# Patient Record
Sex: MALE | Race: WHITE | NOT HISPANIC OR LATINO | URBAN - METROPOLITAN AREA
[De-identification: names, ages, dates, MRNs, and addresses within clinical notes are randomized per-mention and may not be internally consistent; named-entity substitution may affect disease eponyms.]

---
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
## 2019-01-10 ENCOUNTER — OFFICE VISIT (OUTPATIENT)
Dept: PRIMARY CARE | Facility: CLINIC | Age: 19
End: 2019-01-10
Payer: COMMERCIAL

## 2019-01-10 VITALS
HEIGHT: 70 IN | SYSTOLIC BLOOD PRESSURE: 122 MMHG | TEMPERATURE: 98.8 F | HEART RATE: 86 BPM | WEIGHT: 186.6 LBS | BODY MASS INDEX: 26.71 KG/M2 | RESPIRATION RATE: 12 BRPM | OXYGEN SATURATION: 98 % | DIASTOLIC BLOOD PRESSURE: 78 MMHG

## 2019-01-10 DIAGNOSIS — R61 HYPERHIDROSIS: ICD-10-CM

## 2019-01-10 DIAGNOSIS — Z00.00 VISIT FOR WELL MAN HEALTH CHECK: Primary | ICD-10-CM

## 2019-01-10 DIAGNOSIS — Z13.220 SCREENING, LIPID: ICD-10-CM

## 2019-01-10 DIAGNOSIS — F90.0 ATTENTION DEFICIT HYPERACTIVITY DISORDER (ADHD), PREDOMINANTLY INATTENTIVE TYPE: ICD-10-CM

## 2019-01-10 DIAGNOSIS — Z13.1 SCREENING FOR DIABETES MELLITUS (DM): ICD-10-CM

## 2019-01-10 DIAGNOSIS — K21.00 GASTROESOPHAGEAL REFLUX DISEASE WITH ESOPHAGITIS: ICD-10-CM

## 2019-01-10 PROCEDURE — 99395 PREV VISIT EST AGE 18-39: CPT | Performed by: FAMILY MEDICINE

## 2019-01-10 RX ORDER — OMEPRAZOLE 20 MG/1
20 CAPSULE, DELAYED RELEASE ORAL
Qty: 90 CAPSULE | Refills: 1 | Status: SHIPPED | OUTPATIENT
Start: 2019-01-10 | End: 2019-07-03 | Stop reason: ALTCHOICE

## 2019-01-10 RX ORDER — DEXTROAMPHETAMINE SACCHARATE, AMPHETAMINE ASPARTATE, DEXTROAMPHETAMINE SULFATE AND AMPHETAMINE SULFATE 2.5; 2.5; 2.5; 2.5 MG/1; MG/1; MG/1; MG/1
TABLET ORAL
Qty: 30 TABLET | Refills: 0 | Status: SHIPPED | OUTPATIENT
Start: 2019-01-10 | End: 2019-02-21 | Stop reason: SDUPTHER

## 2019-01-10 RX ORDER — DEXTROAMPHETAMINE SACCHARATE, AMPHETAMINE ASPARTATE MONOHYDRATE, DEXTROAMPHETAMINE SULFATE AND AMPHETAMINE SULFATE 5; 5; 5; 5 MG/1; MG/1; MG/1; MG/1
CAPSULE, EXTENDED RELEASE ORAL
Qty: 30 CAPSULE | Refills: 0 | Status: SHIPPED | OUTPATIENT
Start: 2019-01-10 | End: 2019-02-21 | Stop reason: SDUPTHER

## 2019-01-10 ASSESSMENT — ENCOUNTER SYMPTOMS
NEUROLOGICAL NEGATIVE: 1
GASTROINTESTINAL NEGATIVE: 1
CONSTITUTIONAL NEGATIVE: 1
RESPIRATORY NEGATIVE: 1
EYES NEGATIVE: 1
CARDIOVASCULAR NEGATIVE: 1
MUSCULOSKELETAL NEGATIVE: 1
ENDOCRINE NEGATIVE: 1
HEMATOLOGIC/LYMPHATIC NEGATIVE: 1

## 2019-01-10 NOTE — PATIENT INSTRUCTIONS
Patient Education     Health Maintenance, Male  A healthy lifestyle and preventive care is important for your health and wellness. Ask your health care provider about what schedule of regular examinations is right for you.  What should I know about weight and diet?  Eat a Healthy Diet  · Eat plenty of vegetables, fruits, whole grains, low-fat dairy products, and lean protein.  · Do not eat a lot of foods high in solid fats, added sugars, or salt.  Maintain a Healthy Weight  Regular exercise can help you achieve or maintain a healthy weight. You should:  · Do at least 150 minutes of exercise each week. The exercise should increase your heart rate and make you sweat (moderate-intensity exercise).  · Do strength-training exercises at least twice a week.  Watch Your Levels of Cholesterol and Blood Lipids  · Have your blood tested for lipids and cholesterol every 5 years starting at 35 years of age. If you are at high risk for heart disease, you should start having your blood tested when you are 20 years old. You may need to have your cholesterol levels checked more often if:  ¨ Your lipid or cholesterol levels are high.  ¨ You are older than 50 years of age.  ¨ You are at high risk for heart disease.  What should I know about cancer screening?  Many types of cancers can be detected early and may often be prevented.  Lung Cancer  · You should be screened every year for lung cancer if:  ¨ You are a current smoker who has smoked for at least 30 years.  ¨ You are a former smoker who has quit within the past 15 years.  · Talk to your health care provider about your screening options, when you should start screening, and how often you should be screened.  Colorectal Cancer  · Routine colorectal cancer screening usually begins at 50 years of age and should be repeated every 5-10 years until you are 75 years old. You may need to be screened more often if early forms of precancerous polyps or small growths are found. Your  health care provider may recommend screening at an earlier age if you have risk factors for colon cancer.  · Your health care provider may recommend using home test kits to check for hidden blood in the stool.  · A small camera at the end of a tube can be used to examine your colon (sigmoidoscopy or colonoscopy). This checks for the earliest forms of colorectal cancer.  Prostate and Testicular Cancer  · Depending on your age and overall health, your health care provider may do certain tests to screen for prostate and testicular cancer.  · Talk to your health care provider about any symptoms or concerns you have about testicular or prostate cancer.  Skin Cancer  · Check your skin from head to toe regularly.  · Tell your health care provider about any new moles or changes in moles, especially if:  ¨ There is a change in a mole’s size, shape, or color.  ¨ You have a mole that is larger than a pencil eraser.  · Always use sunscreen. Apply sunscreen liberally and repeat throughout the day.  · Protect yourself by wearing long sleeves, pants, a wide-brimmed hat, and sunglasses when outside.  What should I know about heart disease, diabetes, and high blood pressure?  · If you are 18-39 years of age, have your blood pressure checked every 3-5 years. If you are 40 years of age or older, have your blood pressure checked every year. You should have your blood pressure measured twice--once when you are at a hospital or clinic, and once when you are not at a hospital or clinic. Record the average of the two measurements. To check your blood pressure when you are not at a hospital or clinic, you can use:  ¨ An automated blood pressure machine at a pharmacy.  ¨ A home blood pressure monitor.  · Talk to your health care provider about your target blood pressure.  · If you are between 45-79 years old, ask your health care provider if you should take aspirin to prevent heart disease.  · Have regular diabetes screenings by checking  your fasting blood sugar level.  ¨ If you are at a normal weight and have a low risk for diabetes, have this test once every three years after the age of 45.  ¨ If you are overweight and have a high risk for diabetes, consider being tested at a younger age or more often.  · A one-time screening for abdominal aortic aneurysm (AAA) by ultrasound is recommended for men aged 65-75 years who are current or former smokers.  What should I know about preventing infection?  Hepatitis B  If you have a higher risk for hepatitis B, you should be screened for this virus. Talk with your health care provider to find out if you are at risk for hepatitis B infection.  Hepatitis C  Blood testing is recommended for:  · Everyone born from 1945 through 1965.  · Anyone with known risk factors for hepatitis C.  Sexually Transmitted Diseases (STDs)  · You should be screened each year for STDs including gonorrhea and chlamydia if:  ¨ You are sexually active and are younger than 24 years of age.  ¨ You are older than 24 years of age and your health care provider tells you that you are at risk for this type of infection.  ¨ Your sexual activity has changed since you were last screened and you are at an increased risk for chlamydia or gonorrhea. Ask your health care provider if you are at risk.  · Talk with your health care provider about whether you are at high risk of being infected with HIV. Your health care provider may recommend a prescription medicine to help prevent HIV infection.  What else can I do?  · Schedule regular health, dental, and eye exams.  · Stay current with your vaccines (immunizations).  · Do not use any tobacco products, such as cigarettes, chewing tobacco, and e-cigarettes. If you need help quitting, ask your health care provider.  · Limit alcohol intake to no more than 2 drinks per day. One drink equals 12 ounces of beer, 5 ounces of wine, or 1½ ounces of hard liquor.  · Do not use street drugs.  · Do not share  needles.  · Ask your health care provider for help if you need support or information about quitting drugs.  · Tell your health care provider if you often feel depressed.  · Tell your health care provider if you have ever been abused or do not feel safe at home.  This information is not intended to replace advice given to you by your health care provider. Make sure you discuss any questions you have with your health care provider.  Document Released: 06/15/2009 Document Revised: 08/16/2017 Document Reviewed: 09/20/2016  Elsevier Interactive Patient Education © 2018 Elsevier Inc.

## 2019-01-10 NOTE — PROGRESS NOTES
Subjective      Patient ID: Kevin Almanza is a 18 y.o. male.  2000      HPI Here for annual health check and for ADD followup.  States that he sweats a lot especially in the arm pits. Has had gerd recently.     The following have been reviewed and updated as appropriate in this visit:  Tobacco  Allergies  Meds  Med Hx  Surg Hx  Fam Hx  Soc Hx      Review of Systems   Constitutional: Negative.    HENT: Negative.    Eyes: Negative.    Respiratory: Negative.    Cardiovascular: Negative.    Gastrointestinal: Negative.    Endocrine: Negative.    Genitourinary: Negative.    Musculoskeletal: Negative.    Skin: Negative.    Neurological: Negative.    Hematological: Negative.      Active Ambulatory Problems     Diagnosis Date Noted   • Attention deficit hyperactivity disorder (ADHD), predominantly inattentive type 08/08/2016   • Visit for well man health check 01/10/2019   • Hyperhidrosis 01/10/2019   • Gastroesophageal reflux disease with esophagitis 01/10/2019   • Screening, lipid 01/10/2019   • Screening for diabetes mellitus (DM) 01/10/2019     Resolved Ambulatory Problems     Diagnosis Date Noted   • No Resolved Ambulatory Problems     Past Medical History:   Diagnosis Date   • ADD (attention deficit disorder)        Current Outpatient Prescriptions:   •  amphetamine-dextroamphetamine XR (ADDERALL XR) 20 mg 24 hr capsule, Take one capsule by oral route every day in the morning upon awakening, Disp: 30 capsule, Rfl: 0  •  dextroamphetamine-amphetamine (ADDERALL) 10 mg tablet, Take one tablet by oral route every day before breakfast, Disp: 30 tablet, Rfl: 0  •  aluminum chloride (DRYSOL DAB-O-MATIC) 20 % external solution, Apply topically nightly., Disp: 35 mL, Rfl: 1  •  omeprazole (PriLOSEC) 20 mg capsule, Take 1 capsule (20 mg total) by mouth daily before breakfast., Disp: 90 capsule, Rfl: 1  No Known Allergies      Social History     Social History   • Marital status: Single     Spouse name: N/A   • Number of  "children: N/A   • Years of education: N/A     Social History Main Topics   • Smoking status: Never Smoker   • Smokeless tobacco: Never Used   • Alcohol use Yes      Comment: socially   • Drug use: No   • Sexual activity: Yes     Other Topics Concern   • None     Social History Narrative   • None     Family History   Problem Relation Age of Onset   • Family history unknown: Yes     History reviewed. No pertinent surgical history.    Objective     Vitals:    01/10/19 1337   BP: 122/78   BP Location: Right upper arm   Patient Position: Sitting   Pulse: 86   Resp: 12   Temp: 37.1 °C (98.8 °F)   TempSrc: Oral   SpO2: 98%   Weight: 84.6 kg (186 lb 9.6 oz)   Height: 1.778 m (5' 10\")     Body mass index is 26.77 kg/m².    Physical Exam   Constitutional: He is oriented to person, place, and time. He appears well-developed and well-nourished.   HENT:   Head: Normocephalic and atraumatic.   Eyes: EOM are normal. Pupils are equal, round, and reactive to light.   Cardiovascular: Normal rate and regular rhythm.    Pulmonary/Chest: Effort normal.   Abdominal: Soft.   Musculoskeletal: Normal range of motion.   Neurological: He is alert and oriented to person, place, and time.   Skin: Skin is warm.   Nursing note and vitals reviewed.      Assessment/Plan   Problem List Items Addressed This Visit     Attention deficit hyperactivity disorder (ADHD), predominantly inattentive type    Relevant Medications    amphetamine-dextroamphetamine XR (ADDERALL XR) 20 mg 24 hr capsule    dextroamphetamine-amphetamine (ADDERALL) 10 mg tablet    Other Relevant Orders    CBC and Differential    Visit for [facility] health check - Primary    Relevant Orders    CBC and Differential    Hyperhidrosis    Relevant Medications    aluminum chloride (DRYSOL DAB-O-MATIC) 20 % external solution    Other Relevant Orders    CBC and Differential    Gastroesophageal reflux disease with esophagitis    Relevant Medications    omeprazole (PriLOSEC) 20 mg capsule    " Other Relevant Orders    CBC and Differential    Screening, lipid    Relevant Orders    CBC and Differential    Lipid panel    Screening for diabetes mellitus (DM)    Relevant Orders    CBC and Differential    Comprehensive metabolic panel

## 2019-02-21 DIAGNOSIS — F90.0 ATTENTION DEFICIT HYPERACTIVITY DISORDER (ADHD), PREDOMINANTLY INATTENTIVE TYPE: ICD-10-CM

## 2019-02-21 RX ORDER — DEXTROAMPHETAMINE SACCHARATE, AMPHETAMINE ASPARTATE MONOHYDRATE, DEXTROAMPHETAMINE SULFATE AND AMPHETAMINE SULFATE 5; 5; 5; 5 MG/1; MG/1; MG/1; MG/1
CAPSULE, EXTENDED RELEASE ORAL
Qty: 30 CAPSULE | Refills: 0 | Status: SHIPPED | OUTPATIENT
Start: 2019-02-21 | End: 2019-03-28 | Stop reason: SDUPTHER

## 2019-02-21 RX ORDER — DEXTROAMPHETAMINE SACCHARATE, AMPHETAMINE ASPARTATE, DEXTROAMPHETAMINE SULFATE AND AMPHETAMINE SULFATE 2.5; 2.5; 2.5; 2.5 MG/1; MG/1; MG/1; MG/1
TABLET ORAL
Qty: 30 TABLET | Refills: 0 | Status: SHIPPED | OUTPATIENT
Start: 2019-02-21 | End: 2019-03-28 | Stop reason: SDUPTHER

## 2019-03-28 DIAGNOSIS — F90.0 ATTENTION DEFICIT HYPERACTIVITY DISORDER (ADHD), PREDOMINANTLY INATTENTIVE TYPE: ICD-10-CM

## 2019-03-28 RX ORDER — DEXTROAMPHETAMINE SACCHARATE, AMPHETAMINE ASPARTATE MONOHYDRATE, DEXTROAMPHETAMINE SULFATE AND AMPHETAMINE SULFATE 5; 5; 5; 5 MG/1; MG/1; MG/1; MG/1
CAPSULE, EXTENDED RELEASE ORAL
Qty: 30 CAPSULE | Refills: 0 | Status: SHIPPED | OUTPATIENT
Start: 2019-03-28 | End: 2019-04-29 | Stop reason: SDUPTHER

## 2019-03-28 RX ORDER — DEXTROAMPHETAMINE SACCHARATE, AMPHETAMINE ASPARTATE, DEXTROAMPHETAMINE SULFATE AND AMPHETAMINE SULFATE 2.5; 2.5; 2.5; 2.5 MG/1; MG/1; MG/1; MG/1
TABLET ORAL
Qty: 30 TABLET | Refills: 0 | Status: SHIPPED | OUTPATIENT
Start: 2019-03-28 | End: 2019-04-29 | Stop reason: SDUPTHER

## 2019-04-29 DIAGNOSIS — F90.0 ATTENTION DEFICIT HYPERACTIVITY DISORDER (ADHD), PREDOMINANTLY INATTENTIVE TYPE: ICD-10-CM

## 2019-04-29 RX ORDER — DEXTROAMPHETAMINE SACCHARATE, AMPHETAMINE ASPARTATE, DEXTROAMPHETAMINE SULFATE AND AMPHETAMINE SULFATE 2.5; 2.5; 2.5; 2.5 MG/1; MG/1; MG/1; MG/1
TABLET ORAL
Qty: 30 TABLET | Refills: 0 | Status: SHIPPED | OUTPATIENT
Start: 2019-04-29 | End: 2019-08-06 | Stop reason: SDUPTHER

## 2019-04-29 RX ORDER — DEXTROAMPHETAMINE SACCHARATE, AMPHETAMINE ASPARTATE MONOHYDRATE, DEXTROAMPHETAMINE SULFATE AND AMPHETAMINE SULFATE 5; 5; 5; 5 MG/1; MG/1; MG/1; MG/1
CAPSULE, EXTENDED RELEASE ORAL
Qty: 30 CAPSULE | Refills: 0 | Status: SHIPPED | OUTPATIENT
Start: 2019-04-29 | End: 2019-08-06 | Stop reason: SDUPTHER

## 2019-05-16 ENCOUNTER — OFFICE VISIT (OUTPATIENT)
Dept: PRIMARY CARE | Facility: CLINIC | Age: 19
End: 2019-05-16
Payer: COMMERCIAL

## 2019-05-16 VITALS
BODY MASS INDEX: 25.02 KG/M2 | OXYGEN SATURATION: 96 % | HEART RATE: 103 BPM | DIASTOLIC BLOOD PRESSURE: 58 MMHG | WEIGHT: 174.4 LBS | SYSTOLIC BLOOD PRESSURE: 114 MMHG | RESPIRATION RATE: 12 BRPM

## 2019-05-16 DIAGNOSIS — G25.81 RESTLESS LEG SYNDROME: Primary | ICD-10-CM

## 2019-05-16 DIAGNOSIS — G47.00 INSOMNIA, UNSPECIFIED TYPE: ICD-10-CM

## 2019-05-16 PROCEDURE — 99214 OFFICE O/P EST MOD 30 MIN: CPT | Performed by: FAMILY MEDICINE

## 2019-05-16 RX ORDER — ROPINIROLE 3 MG/1
3 TABLET, FILM COATED ORAL NIGHTLY
Qty: 30 TABLET | Refills: 1 | Status: SHIPPED | OUTPATIENT
Start: 2019-05-16 | End: 2019-06-27 | Stop reason: ALTCHOICE

## 2019-05-16 ASSESSMENT — ENCOUNTER SYMPTOMS
EYES NEGATIVE: 1
HEMATOLOGIC/LYMPHATIC NEGATIVE: 1
PSYCHIATRIC NEGATIVE: 1
CARDIOVASCULAR NEGATIVE: 1
RESPIRATORY NEGATIVE: 1
NEUROLOGICAL NEGATIVE: 1
MUSCULOSKELETAL NEGATIVE: 1
CONSTITUTIONAL NEGATIVE: 1
GASTROINTESTINAL NEGATIVE: 1

## 2019-05-16 NOTE — PATIENT INSTRUCTIONS
Patient Education   Restless Legs Syndrome  Restless legs syndrome is a condition that causes uncomfortable feelings or sensations in the legs, especially while sitting or lying down. The sensations usually cause an overwhelming urge to move the legs. The arms can also sometimes be affected.  The condition can range from mild to severe. The symptoms often interfere with a person's ability to sleep.  What are the causes?  The cause of this condition is not known.  What increases the risk?  This condition is more likely to develop in:  · People who are older than age 50.  · Pregnant women. In general, restless legs syndrome is more common in women than in men.  · People who have a family history of the condition.  · People who have certain medical conditions, such as iron deficiency, kidney disease, Parkinson disease, or nerve damage.  · People who take certain medicines, such as medicines for high blood pressure, nausea, colds, allergies, depression, and some heart conditions.  What are the signs or symptoms?  The main symptom of this condition is uncomfortable sensations in the legs. These sensations may be:  · Described as pulling, tingling, prickling, throbbing, crawling, or burning.  · Worse while you are sitting or lying down.  · Worse during periods of rest or inactivity.  · Worse at night, often interfering with your sleep.  · Accompanied by a very strong urge to move your legs.  · Temporarily relieved by movement of your legs.  The sensations usually affect both sides of the body. The arms can also be affected, but this is rare. People who have this condition often have tiredness during the day because of their lack of sleep at night.  How is this diagnosed?  This condition may be diagnosed based on your description of the symptoms. You may also have tests, including blood tests, to check for other conditions that may lead to your symptoms. In some cases, you may be asked to spend some time in a sleep lab  so your sleeping can be monitored.  How is this treated?  Treatment for this condition is focused on managing the symptoms. Treatment may include:  · Self-help and lifestyle changes.  · Medicines.  Follow these instructions at home:  · Take medicines only as directed by your health care provider.  · Try these methods to get temporary relief from the uncomfortable sensations:  ¨ Massage your legs.  ¨ Walk or stretch.  ¨ Take a cold or hot bath.  · Practice good sleep habits. For example, go to bed and get up at the same time every day.  · Exercise regularly.  · Practice ways of relaxing, such as yoga or meditation.  · Avoid caffeine and alcohol.  · Do not use any tobacco products, including cigarettes, chewing tobacco, or electronic cigarettes. If you need help quitting, ask your health care provider.  · Keep all follow-up visits as directed by your health care provider. This is important.  Contact a health care provider if:  Your symptoms do not improve with treatment, or they get worse.  This information is not intended to replace advice given to you by your health care provider. Make sure you discuss any questions you have with your health care provider.  Document Released: 12/08/2003 Document Revised: 05/25/2017 Document Reviewed: 12/14/2015  [employer] Interactive Patient Education © 2018 [employer]

## 2019-05-16 NOTE — PROGRESS NOTES
Patient called stating that she called 10 days ago and would like for DR Dominique Bah to call her. She states that he will call her back.  I will route the call to the provider for him to call her at his earliest time Subjective      Patient ID: Kevin Almanza is a 19 y.o. male.  2000      HPI States that restless leg syndrome and insomnia started about a month ago. Is not sleeping well.    The following have been reviewed and updated as appropriate in this visit:  Tobacco  Allergies  Meds  Med Hx  Surg Hx  Fam Hx  Soc Hx      Review of Systems   Constitutional: Negative.    HENT: Negative.    Eyes: Negative.    Respiratory: Negative.    Cardiovascular: Negative.    Gastrointestinal: Negative.    Genitourinary: Negative.    Musculoskeletal: Negative.    Neurological: Negative.    Hematological: Negative.    Psychiatric/Behavioral: Negative.      Active Ambulatory Problems     Diagnosis Date Noted   • Attention deficit hyperactivity disorder (ADHD), predominantly inattentive type 08/08/2016   • Visit for well man health check 01/10/2019   • Hyperhidrosis 01/10/2019   • Gastroesophageal reflux disease with esophagitis 01/10/2019   • Screening, lipid 01/10/2019   • Screening for diabetes mellitus (DM) 01/10/2019   • Restless leg syndrome 05/16/2019   • Insomnia 05/16/2019     Resolved Ambulatory Problems     Diagnosis Date Noted   • No Resolved Ambulatory Problems     Past Medical History:   Diagnosis Date   • ADD (attention deficit disorder)        Current Outpatient Prescriptions:   •  amphetamine-dextroamphetamine XR (ADDERALL XR) 20 mg 24 hr capsule, Take one capsule by oral route every day in the morning upon awakening, Disp: 30 capsule, Rfl: 0  •  dextroamphetamine-amphetamine (ADDERALL) 10 mg tablet, Take one tablet by oral route every day before breakfast, Disp: 30 tablet, Rfl: 0  •  aluminum chloride (DRYSOL DAB-O-MATIC) 20 % external solution, Apply topically nightly. (Patient not taking: Reported on 5/16/2019 ), Disp: 35 mL, Rfl: 1  •  omeprazole (PriLOSEC) 20 mg capsule, Take 1 capsule (20 mg total) by mouth daily before breakfast. (Patient not taking: Reported on 5/16/2019 ), Disp: 90 capsule, Rfl: 1  •   rOPINIRole (REQUIP) 3 mg tablet, Take 1 tablet (3 mg total) by mouth nightly., Disp: 30 tablet, Rfl: 1  No Known Allergies  Social History     Social History   • Marital status: Single     Spouse name: N/A   • Number of children: N/A   • Years of education: N/A     Social History Main Topics   • Smoking status: Never Smoker   • Smokeless tobacco: Never Used   • Alcohol use Yes      Comment: socially   • Drug use: No   • Sexual activity: Yes     Other Topics Concern   • None     Social History Narrative   • None     Family History   Problem Relation Age of Onset   • Family history unknown: Yes     History reviewed. No pertinent surgical history.    Objective     Vitals:    05/16/19 1138   BP: (!) 114/58   Pulse: (!) 103   Resp: 12   SpO2: 96%   Weight: 79.1 kg (174 lb 6.4 oz)     Body mass index is 25.02 kg/m².    Physical Exam   Constitutional: He is oriented to person, place, and time. He appears well-developed and well-nourished.   HENT:   Head: Normocephalic and atraumatic.   Eyes: Pupils are equal, round, and reactive to light. EOM are normal.   Cardiovascular: Normal rate and regular rhythm.    Pulmonary/Chest: Effort normal.   Abdominal: Soft.   Musculoskeletal: Normal range of motion.   Neurological: He is alert and oriented to person, place, and time.   Nursing note and vitals reviewed.      Assessment/Plan   Diagnoses and all orders for this visit:    Restless leg syndrome (Primary)  Assessment & Plan:  Will start on requip      Insomnia, unspecified type  Assessment & Plan:  May be secondary to RLS.      Other orders  -     rOPINIRole (REQUIP) 3 mg tablet; Take 1 tablet (3 mg total) by mouth nightly.

## 2019-05-21 ENCOUNTER — TELEPHONE (OUTPATIENT)
Dept: PRIMARY CARE | Facility: CLINIC | Age: 19
End: 2019-05-21

## 2019-05-21 LAB
ALBUMIN SERPL-MCNC: 4.9 G/DL (ref 3.5–5.5)
ALBUMIN/GLOB SERPL: 2 {RATIO} (ref 1.2–2.2)
ALP SERPL-CCNC: 60 IU/L (ref 39–117)
ALT SERPL-CCNC: 16 IU/L (ref 0–44)
AST SERPL-CCNC: 22 IU/L (ref 0–40)
BASOPHILS # BLD AUTO: 0.1 X10E3/UL (ref 0–0.2)
BASOPHILS NFR BLD AUTO: 1 %
BILIRUB SERPL-MCNC: 0.5 MG/DL (ref 0–1.2)
BUN SERPL-MCNC: 11 MG/DL (ref 6–20)
BUN/CREAT SERPL: 11 (ref 9–20)
CALCIUM SERPL-MCNC: 9.6 MG/DL (ref 8.7–10.2)
CHLORIDE SERPL-SCNC: 99 MMOL/L (ref 96–106)
CHOLEST SERPL-MCNC: 224 MG/DL (ref 100–199)
CO2 SERPL-SCNC: 23 MMOL/L (ref 20–29)
CREAT SERPL-MCNC: 1.03 MG/DL (ref 0.76–1.27)
EOSINOPHIL # BLD AUTO: 0.1 X10E3/UL (ref 0–0.4)
EOSINOPHIL NFR BLD AUTO: 2 %
ERYTHROCYTE [DISTWIDTH] IN BLOOD BY AUTOMATED COUNT: 13.9 % (ref 12.3–15.4)
GLOBULIN SER CALC-MCNC: 2.5 G/DL (ref 1.5–4.5)
GLUCOSE SERPL-MCNC: 102 MG/DL (ref 65–99)
HCT VFR BLD AUTO: 47.7 % (ref 37.5–51)
HDLC SERPL-MCNC: 77 MG/DL
HGB BLD-MCNC: 15.8 G/DL (ref 13–17.7)
IMM GRANULOCYTES # BLD AUTO: 0 X10E3/UL (ref 0–0.1)
IMM GRANULOCYTES NFR BLD AUTO: 0 %
LAB CORP EGFR IF AFRICN AM: 120 ML/MIN/1.73
LAB CORP EGFR IF NONAFRICN AM: 104 ML/MIN/1.73
LDLC SERPL CALC-MCNC: 126 MG/DL (ref 0–99)
LYMPHOCYTES # BLD AUTO: 1.7 X10E3/UL (ref 0.7–3.1)
LYMPHOCYTES NFR BLD AUTO: 30 %
MCH RBC QN AUTO: 30.3 PG (ref 26.6–33)
MCHC RBC AUTO-ENTMCNC: 33.1 G/DL (ref 31.5–35.7)
MCV RBC AUTO: 91 FL (ref 79–97)
MONOCYTES # BLD AUTO: 0.5 X10E3/UL (ref 0.1–0.9)
MONOCYTES NFR BLD AUTO: 8 %
NEUTROPHILS # BLD AUTO: 3.5 X10E3/UL (ref 1.4–7)
NEUTROPHILS NFR BLD AUTO: 59 %
PLATELET # BLD AUTO: 289 X10E3/UL (ref 150–450)
POTASSIUM SERPL-SCNC: 3.9 MMOL/L (ref 3.5–5.2)
PROT SERPL-MCNC: 7.4 G/DL (ref 6–8.5)
RBC # BLD AUTO: 5.22 X10E6/UL (ref 4.14–5.8)
SODIUM SERPL-SCNC: 140 MMOL/L (ref 134–144)
TRIGL SERPL-MCNC: 105 MG/DL (ref 0–149)
VLDLC SERPL CALC-MCNC: 21 MG/DL (ref 5–40)
WBC # BLD AUTO: 5.8 X10E3/UL (ref 3.4–10.8)

## 2019-05-21 NOTE — TELEPHONE ENCOUNTER
----- Message from Julian Blanca DO sent at 5/21/2019 10:01 AM EDT -----  Please inform patient that his lab work was all normal except for an elevated cholesterol of 224. He needs to watch his diet and exercise. I would like to recheck his labs in 3-6 months.

## 2019-06-06 ENCOUNTER — OFFICE VISIT (OUTPATIENT)
Dept: PRIMARY CARE | Facility: CLINIC | Age: 19
End: 2019-06-06
Payer: COMMERCIAL

## 2019-06-06 VITALS
HEART RATE: 70 BPM | BODY MASS INDEX: 25.83 KG/M2 | WEIGHT: 180 LBS | SYSTOLIC BLOOD PRESSURE: 120 MMHG | OXYGEN SATURATION: 98 % | DIASTOLIC BLOOD PRESSURE: 80 MMHG

## 2019-06-06 DIAGNOSIS — G25.9 MOVEMENT DISORDER: Primary | ICD-10-CM

## 2019-06-06 DIAGNOSIS — G25.81 RESTLESS LEG SYNDROME: ICD-10-CM

## 2019-06-06 PROCEDURE — 99214 OFFICE O/P EST MOD 30 MIN: CPT | Performed by: FAMILY MEDICINE

## 2019-06-06 ASSESSMENT — ENCOUNTER SYMPTOMS
CARDIOVASCULAR NEGATIVE: 1
GASTROINTESTINAL NEGATIVE: 1
HEMATOLOGIC/LYMPHATIC NEGATIVE: 1
CONSTITUTIONAL NEGATIVE: 1
RESPIRATORY NEGATIVE: 1
NEUROLOGICAL NEGATIVE: 1
MUSCULOSKELETAL NEGATIVE: 1
EYES NEGATIVE: 1

## 2019-06-06 NOTE — PROGRESS NOTES
Subjective      Patient ID: Kevin Almanza is a 19 y.o. male.  2000      HPI Has restless leg syndrome thathas not been helped with requip although patient only used it for a few days.  Now says that he has random movement during the day.     The following have been reviewed and updated as appropriate in this visit:  Tobacco  Allergies  Meds  Med Hx  Surg Hx  Fam Hx  Soc Hx      Review of Systems   Constitutional: Negative.    HENT: Negative.    Eyes: Negative.    Respiratory: Negative.    Cardiovascular: Negative.    Gastrointestinal: Negative.    Genitourinary: Negative.    Musculoskeletal: Negative.    Neurological: Negative.    Hematological: Negative.      Active Ambulatory Problems     Diagnosis Date Noted   • Attention deficit hyperactivity disorder (ADHD), predominantly inattentive type 08/08/2016   • Visit for well man health check 01/10/2019   • Hyperhidrosis 01/10/2019   • Gastroesophageal reflux disease with esophagitis 01/10/2019   • Screening, lipid 01/10/2019   • Screening for diabetes mellitus (DM) 01/10/2019   • Restless leg syndrome 05/16/2019   • Insomnia 05/16/2019   • Movement disorder 06/06/2019     Resolved Ambulatory Problems     Diagnosis Date Noted   • No Resolved Ambulatory Problems     Past Medical History:   Diagnosis Date   • ADD (attention deficit disorder)        Current Outpatient Prescriptions:   •  amphetamine-dextroamphetamine XR (ADDERALL XR) 20 mg 24 hr capsule, Take one capsule by oral route every day in the morning upon awakening, Disp: 30 capsule, Rfl: 0  •  dextroamphetamine-amphetamine (ADDERALL) 10 mg tablet, Take one tablet by oral route every day before breakfast, Disp: 30 tablet, Rfl: 0  •  aluminum chloride (DRYSOL DAB-O-MATIC) 20 % external solution, Apply topically nightly. (Patient not taking: Reported on 6/6/2019 ), Disp: 35 mL, Rfl: 1  •  omeprazole (PriLOSEC) 20 mg capsule, Take 1 capsule (20 mg total) by mouth daily before breakfast. (Patient  not taking: Reported on 6/6/2019 ), Disp: 90 capsule, Rfl: 1  •  rOPINIRole (REQUIP) 3 mg tablet, Take 1 tablet (3 mg total) by mouth nightly. (Patient not taking: Reported on 6/6/2019 ), Disp: 30 tablet, Rfl: 1  No Known Allergies  Social History     Social History   • Marital status: Single     Spouse name: N/A   • Number of children: N/A   • Years of education: N/A     Social History Main Topics   • Smoking status: Never Smoker   • Smokeless tobacco: Never Used   • Alcohol use Yes      Comment: socially   • Drug use: No   • Sexual activity: Yes     Other Topics Concern   • None     Social History Narrative   • None     Family History   Problem Relation Age of Onset   • Family history unknown: Yes     History reviewed. No pertinent surgical history.    Objective     Vitals:    06/06/19 1038   BP: 120/80   Pulse: 70   SpO2: 98%   Weight: 81.6 kg (180 lb)     Body mass index is 25.83 kg/m².    Physical Exam   Constitutional: He appears well-developed and well-nourished.   HENT:   Head: Normocephalic and atraumatic.   Eyes: Pupils are equal, round, and reactive to light. EOM are normal.   Neck: Normal range of motion.   Cardiovascular: Normal rate and regular rhythm.    Pulmonary/Chest: Effort normal and breath sounds normal.   Abdominal: Soft.   Musculoskeletal: Normal range of motion.   Neurological: He is alert.   Skin: Skin is warm.   Nursing note and vitals reviewed.      Assessment/Plan   Diagnoses and all orders for this visit:    Movement disorder (Primary)  -     Ambulatory referral to Neurology; Future    Restless leg syndrome  -     Ambulatory referral to Neurology; Future

## 2019-06-06 NOTE — PATIENT INSTRUCTIONS
Patient Education   Restless Legs Syndrome  Restless legs syndrome is a condition that causes uncomfortable feelings or sensations in the legs, especially while sitting or lying down. The sensations usually cause an overwhelming urge to move the legs. The arms can also sometimes be affected.  The condition can range from mild to severe. The symptoms often interfere with a person's ability to sleep.  What are the causes?  The cause of this condition is not known.  What increases the risk?  This condition is more likely to develop in:  · People who are older than age 50.  · Pregnant women. In general, restless legs syndrome is more common in women than in men.  · People who have a family history of the condition.  · People who have certain medical conditions, such as iron deficiency, kidney disease, Parkinson disease, or nerve damage.  · People who take certain medicines, such as medicines for high blood pressure, nausea, colds, allergies, depression, and some heart conditions.  What are the signs or symptoms?  The main symptom of this condition is uncomfortable sensations in the legs. These sensations may be:  · Described as pulling, tingling, prickling, throbbing, crawling, or burning.  · Worse while you are sitting or lying down.  · Worse during periods of rest or inactivity.  · Worse at night, often interfering with your sleep.  · Accompanied by a very strong urge to move your legs.  · Temporarily relieved by movement of your legs.  The sensations usually affect both sides of the body. The arms can also be affected, but this is rare. People who have this condition often have tiredness during the day because of their lack of sleep at night.  How is this diagnosed?  This condition may be diagnosed based on your description of the symptoms. You may also have tests, including blood tests, to check for other conditions that may lead to your symptoms. In some cases, you may be asked to spend some time in a sleep lab  so your sleeping can be monitored.  How is this treated?  Treatment for this condition is focused on managing the symptoms. Treatment may include:  · Self-help and lifestyle changes.  · Medicines.  Follow these instructions at home:  · Take medicines only as directed by your health care provider.  · Try these methods to get temporary relief from the uncomfortable sensations:  ¨ Massage your legs.  ¨ Walk or stretch.  ¨ Take a cold or hot bath.  · Practice good sleep habits. For example, go to bed and get up at the same time every day.  · Exercise regularly.  · Practice ways of relaxing, such as yoga or meditation.  · Avoid caffeine and alcohol.  · Do not use any tobacco products, including cigarettes, chewing tobacco, or electronic cigarettes. If you need help quitting, ask your health care provider.  · Keep all follow-up visits as directed by your health care provider. This is important.  Contact a health care provider if:  Your symptoms do not improve with treatment, or they get worse.  This information is not intended to replace advice given to you by your health care provider. Make sure you discuss any questions you have with your health care provider.  Document Released: 12/08/2003 Document Revised: 05/25/2017 Document Reviewed: 12/14/2015  [employer] Interactive Patient Education © 2018 [employer]

## 2019-06-27 ENCOUNTER — OFFICE VISIT (OUTPATIENT)
Dept: NEUROLOGY | Facility: CLINIC | Age: 19
End: 2019-06-27
Payer: COMMERCIAL

## 2019-06-27 VITALS — RESPIRATION RATE: 14 BRPM | DIASTOLIC BLOOD PRESSURE: 70 MMHG | SYSTOLIC BLOOD PRESSURE: 124 MMHG | HEART RATE: 85 BPM

## 2019-06-27 DIAGNOSIS — G25.81 RESTLESS LEG SYNDROME: Primary | ICD-10-CM

## 2019-06-27 PROCEDURE — 99245 OFF/OP CONSLTJ NEW/EST HI 55: CPT | Performed by: PSYCHIATRY & NEUROLOGY

## 2019-06-27 NOTE — PROGRESS NOTES
Patient ID: Kevin Almanza                              : 2000  MRN: [id number]                                            VISIT DATE: 2019    PRIMARY CARE PROVIDER: Julian Denson DO    CONSULTING PHYSICIAN: Jaime Campos DO    CHIEF COMPLAINT: Restless Legs    HISTORY OF PRESENT ILLNESS:  Dear Dr. Denson,    I had the pleasure of seeing your patient Mr. Kevin Almanza at the [facility] neurolog[facility] clinic for a consultation.  He is accompanied by his father.    As you know, Mr. Almanza is a 19 year old left handed male with a past medical history of ADD and dyslipidemia who presents with intermittent limb movements.  Symptoms began about 2 months ago immediately after he fell and struck his head.  States that he had been drinking alcohol beverages with friends and may have tripped and fallen and landed on his face.  He woke up the next day and noted blood coming out of his nose.  Was evaluated by a medical provider and told that he had a contusion.  Since then he has been noticing episodes of brief sudden leg movements/twitching particularly at night when he is trying to sleep.  Can occur every 30 seconds.  Occasional episodes occur during the daytime.  Denies any urge to move his limbs prior to the movements.  Denies any neck or back pain.  Denies incontinence or erectile dysfunction.  Denies facial droop, vision loss, speech disturbance, limb weakness nor gait ataxia.  He has never had symptoms like this in the past.  Does note that his head and knees can feel uncomfortableprior to his limb movements.  Was started briefly on ropinerole by his PCP, but discontinued after 3 doses as he agnieszka his twitching was worse and he was getting nauseous.    Sleeps 7-8 hours per night.  Drinks occasional coffee in the morning, but not past 7 am.  Does have daytime sleepiness.  Snores on occasion such as when he has an upper respiratory infection.      States he was born full term and reached all  developmental milestones at the expected age.  Denies history of febrile seizure as an infant or young child.  Denies family history of epilepsy nor other significant neurological disease.    He  is currently a mateus in college.  He goes to [employer] in [place].  Will be returning to [place] for college on August 8th.  He is studying business management.      Has a history of ADD and takes Adderall, but not during the summer.    Denies AH/VH/SI.  Denies feeling threatened at school, home or work.  He is physically active, working out in the gym and playing basketball regularly.  He is independent for all ADL's including walking on his own power.    I reviewed his outside medical records personally.    MEDICATIONS:   Current Outpatient Prescriptions:   •  amphetamine-dextroamphetamine XR (ADDERALL XR) 20 mg 24 hr capsule, Take one capsule by oral route every day in the morning upon awakening, Disp: 30 capsule, Rfl: 0  •  dextroamphetamine-amphetamine (ADDERALL) 10 mg tablet, Take one tablet by oral route every day before breakfast, Disp: 30 tablet, Rfl: 0  •  aluminum chloride (DRYSOL DAB-O-MATIC) 20 % external solution, Apply topically nightly. (Patient not taking: Reported on 6/6/2019 ), Disp: 35 mL, Rfl: 1  •  gabapentin enacarbil 300 mg tablet extended release, Take 300 mg by mouth nightly., Disp: 30 tablet, Rfl: 3  •  omeprazole (PriLOSEC) 20 mg capsule, Take 1 capsule (20 mg total) by mouth daily before breakfast. (Patient not taking: Reported on 6/6/2019 ), Disp: 90 capsule, Rfl: 1    PAST MEDICAL HISTORY:  has a past medical history of ADD (attention deficit disorder) and Lipid disorder.    PAST SURGICAL HISTORY:  has no past surgical history on file.    SOCIAL HISTORY:  reports that he has never smoked. He has never used smokeless tobacco. He reports that he drinks alcohol. He reports that he does not use drugs.  He is a college student.  Works as a   part-time.    FAMILY HISTORY: Family history is unknown by patient.    ALLERGIES: has No Known Allergies.     REVIEW OF SYSTEMS: Joint pain, head trauma and trouble sleeping.  All other systems reviewed and negative except as noted in the HPI.    PHYSICAL EXAM:  /70   Pulse 85   Resp 14   GENERAL APPEARANCE: Athletic, well appearing, well nourished and normally developed male.  Not in acute distress.  Appears stated age.  HEAD: Normocephalic, atraumatic.  EYES:  Sclerae white. Conjunctivae clear.  FUNDOSCOPIC: Flat optic discs. No papilledema. No retinal hemorrhages.  NECK:  No bruits auscultated over the carotid regions. Neck was supple.  CARDIOVASCULAR:  Regular rate and rhythm. S1, S2 auscultated. No murmurs, rubs or gallops.  RESPIRATORY: Lungs clear to auscultation. No crackles, rhonchi or wheezing.  EXTREMITIES: No clubbing, no cyanosis nor edema.  MUSCULOSKELETAL: Normal muscle bulk and tone.  No spasticity nor rigidity.  No tremor.  SKIN:  Dry, intact. No rashes noted. Warm to touch.  LYMPH: No abnormal lymph nodes palpated in the neck region.  PSYCHIATRIC: Calm and cooperative with appropriate insight    NEUROLOGICAL EXAM:  MENTAL STATUS: Alert, awake and oriented with spontaneous fluent speech output.  CRANIAL NERVES: Symmetrically intact red color saturation.  Full visual fields to finger-counting. Pupils are equal, round and reactive to light. Extraocular movements are intact. No nystagmus. Symmetric smile. Uvula and tongue is midline. No dysarthria. No dysphonia.  MOTOR STRENGTH:  5/5 biceps, triceps, deltoid, finger spread, opponens pollicis,  hip flexor, knee extension, knee flexion and foot dorsiflexion bilaterally. No pronator drift noted.  REFLEXES:  2+ biceps, brachioradialis and triceps bilaterally. 2+ patellar, 1+ Achilles reflexes bilaterally.   SENSATION: Face, arms and legs were intact to light touch, cold temperature and vibration sensation.  COORDINATION/GAIT:   Finger-to-nose-to-finger was intact, no dysmetria.  Rapid alternating movements to finger tapping was intact, no dysdiadochokinesia.  Intact station and gait with a normal arm swing and stride length. No listing or ataxia was seen.      LABORATORY STUDIES:  9/9/2011: Lyme Margarita IgG < 80, Lyme IgM interpretation nonreactive  9/10/2011: Lumbar puncture CSF WBC 8, RBC < 1, gram stain no bacterial growth, enterovirus negative, Lyme DNA not detected,   5/20/2019: Cholesterol 224, triglyceride 105, HDL 77, ,  CBC Results       05/20/19                          1237           WBC 5.8           RBC 5.22           HGB 15.8           HCT 47.7           MCV 91           MCH 30.3           MCHC 33.1                      Comment for PLT at 1237 on 05/20/19:                **Please note reference interval change**      BMP Results       05/20/19                          1237                      K 3.9           Cl 99           CO2 23           Glucose 102 (H)           BUN 11           Creatinine 1.03           EGFR 104            120            Labs were reviewed by me personally.      IMAGING STUDIES:   No recent CNS imaging.      ASSESSMENT:  A 19 year old left handed male who has been having intermittent limb twitching particularly when trying to sleep after falling/hitting his head 2 months ago.  Concern is for possible restless leg syndrome.  Differential diagnosis includes: myoclonic epilepsy, postconcussion syndrome, multiple sclerosis and/or other metabolic derangement.      RECOMMENDATIONS:    Limb twitching, Head/knee discomfort  - I have ordered an MRI brain with/without contrast to rule out intracranial structural lesion such as CNS to monitor disease/multiple sclerosis.  I have ordered a routine EEG study to evaluate for letter graphic tendency towards epilepsy seizures.  Ideally we will try to capture/characterize typical events.  I have ordered routine labs including TSH, T4, T3, vitamin B12,  total CK, iron, TIBC and ferritin levels.  - Will pursue a trial with gabapentin encarbil 300 mg QHS.  Potential drug side effects were discussed with the patient.  Prescription was sent to his pharmacy.  - Supportive care measures including: emotional stress reduction, avoid sleep deprivation (aim to sleep 7-8 hours per night), avoid dehydration (aim to drink 7-8 glasses of water per day), avoid skipping meals (aim to eat 3 meals per day).  - Good sleep hygiene/habits were advised.    I have asked the patient to follow-up in the neurology clinic in 1-2 months to monitor his clinical course closely.    Thank you for allowing me to participate in the care of your patient.  Please feel free to contact me at any time if you have any further questions.    39 minutes total face-to-face encounter time, of which greater than 50% of time was spent counseling/coordination of care.    Jaime Campos DO  Neurologist  [facility]

## 2019-06-27 NOTE — PATIENT INSTRUCTIONS
"Please check your MRI brain scan with contrast.  Please speak with Ms. Krupa Thomas about getting a prior authorization to get your MRI study.    Please check your EEG study.  Call [phone] to schedule this study.    Please check your labs.      Supportive care measures including: emotional stress reduction, avoid sleep deprivation (aim to sleep 7-8 hours per night), avoid dehydration (aim to drink 7-8 glasses of water per day), avoid skipping meals (aim to eat 3 meals per day).      Start gabapentin 1 tablet once at night (around 5-6 pm).      Practice good sleep hygiene/habits:    * Sleep only long enough to feel rested and then get out of bed    * Go to bed and get up at the same time every day    * Do not try to force yourself to sleep. If you can't sleep, get out of bed and try again later.    * Have coffee, tea, and other foods that have caffeine only in the morning    * Avoid alcohol in the late afternoon, evening, and bedtime    * Avoid smoking, especially in the evening    * Keep your bedroom dark, cool, quiet, and free of reminders of work or other things that cause you stress    * Solve problems you have before you go to bed    * Exercise several days a week, but not right before bed    * Avoid looking at phones or reading devices (\"e-books\") that give off light before bed. This can make it harder to fall asleep.  "

## 2019-07-15 ENCOUNTER — HOSPITAL ENCOUNTER (OUTPATIENT)
Dept: RADIOLOGY | Facility: HOSPITAL | Age: 19
Discharge: HOME | End: 2019-07-15
Attending: PSYCHIATRY & NEUROLOGY
Payer: COMMERCIAL

## 2019-07-15 VITALS — BODY MASS INDEX: 25.83 KG/M2 | WEIGHT: 180 LBS

## 2019-07-15 PROCEDURE — A9585 GADOBUTROL INJECTION: HCPCS | Performed by: PSYCHIATRY & NEUROLOGY

## 2019-07-15 PROCEDURE — 70553 MRI BRAIN STEM W/O & W/DYE: CPT

## 2019-07-15 RX ORDER — GADOBUTROL 604.72 MG/ML
0.1 INJECTION INTRAVENOUS ONCE
Status: COMPLETED | OUTPATIENT
Start: 2019-07-15 | End: 2019-07-15

## 2019-07-15 RX ADMIN — GADOBUTROL 8.2 MMOL: 604.72 INJECTION INTRAVENOUS at 09:20

## 2019-07-18 ENCOUNTER — HOSPITAL ENCOUNTER (OUTPATIENT)
Dept: CARDIOLOGY | Facility: HOSPITAL | Age: 19
Discharge: HOME | End: 2019-07-18
Attending: PSYCHIATRY & NEUROLOGY
Payer: COMMERCIAL

## 2019-07-18 PROCEDURE — 95816 EEG AWAKE AND DROWSY: CPT

## 2019-07-30 ENCOUNTER — OFFICE VISIT (OUTPATIENT)
Dept: NEUROLOGY | Facility: CLINIC | Age: 19
End: 2019-07-30
Payer: COMMERCIAL

## 2019-07-30 VITALS
TEMPERATURE: 98 F | HEART RATE: 74 BPM | HEIGHT: 71 IN | SYSTOLIC BLOOD PRESSURE: 120 MMHG | OXYGEN SATURATION: 98 % | BODY MASS INDEX: 25.34 KG/M2 | DIASTOLIC BLOOD PRESSURE: 68 MMHG | WEIGHT: 181 LBS | RESPIRATION RATE: 16 BRPM

## 2019-07-30 DIAGNOSIS — R74.8 ELEVATED CREATINE KINASE: ICD-10-CM

## 2019-07-30 DIAGNOSIS — G25.81 RESTLESS LEG SYNDROME: Primary | ICD-10-CM

## 2019-07-30 PROCEDURE — 99213 OFFICE O/P EST LOW 20 MIN: CPT | Performed by: PSYCHIATRY & NEUROLOGY

## 2019-07-30 NOTE — PROGRESS NOTES
Patient ID: Kevin Almanza                              : 2000  MRN: [id number]                                            VISIT DATE: 2019    PRIMARY CARE PROVIDER: Julian Denson DO    CONSULTING PHYSICIAN: Jaime Campos DO    CHIEF COMPLAINT: Follow up    HISTORY OF PRESENT ILLNESS:  Dear Dr. Denson,     Mr. Kevin Almanza returns to the [facility] neurology clinic for a follow-up visit.  He is accompanied by his mother.    As you know, Mr. Almanza is a 19 year old left handed male with a past medical history of ADD, dyslipidemia and suspected restless leg syndrome.    He was started on gabapentin encarbil 300 mg QHS after his last neurology clinic and has noticed fewer leg movements at night when he takes the medication.  He reports no drug adverse side effects.    Sleeps 6-7 hours per night.    He has been working at a [employer] during the summer and will return to college in early 2019.    He notes he sweats a lot, both when exercising in the gym or even at rest.  Denies chest pain, palpitations, dizziness, lightheadedness, dyspnea nor change in temperature intolerance.  He is independent for all ADL's and walks on his own power.    MEDICATIONS:   Current Outpatient Prescriptions:   •  amphetamine-dextroamphetamine XR (ADDERALL XR) 20 mg 24 hr capsule, Take one capsule by oral route every day in the morning upon awakening, Disp: 30 capsule, Rfl: 0  •  dextroamphetamine-amphetamine (ADDERALL) 10 mg tablet, Take one tablet by oral route every day before breakfast, Disp: 30 tablet, Rfl: 0  •  gabapentin enacarbil 300 mg tablet extended release, Take 300 mg by mouth nightly., Disp: 30 tablet, Rfl: 3    PAST MEDICAL HISTORY:  has a past medical history of ADD (attention deficit disorder) and Lipid disorder.    PAST SURGICAL HISTORY:  has no past surgical history on file.    SOCIAL HISTORY:  reports that he has never smoked. He has never used smokeless tobacco. He reports  "that he drinks alcohol. He reports that he does not use drugs.    FAMILY HISTORY: Family history is unknown by patient.    ALLERGIES: has No Known Allergies.     REVIEW OF SYSTEMS:  All other systems reviewed and negative except as noted in the HPI.    PHYSICAL EXAM:  /68 (BP Location: Right upper arm, Patient Position: Sitting)   Pulse 74   Temp 36.7 °C (98 °F) (Oral)   Resp 16   Ht 1.803 m (5' 11\")   Wt 82.1 kg (181 lb)   SpO2 98%   BMI 25.24 kg/m²   GENERAL APPEARANCE: Athletic, well appearing, well nourished and normally developed male.  Not in acute distress.  Appears stated age.  HEAD: Normocephalic, atraumatic.  EYES:  Sclerae white. Conjunctivae clear.  NECK:  No bruits auscultated over the carotid regions. Neck was supple.  CARDIOVASCULAR:  Regular rate and rhythm. S1, S2 auscultated. No murmurs, rubs or gallops.  RESPIRATORY: Lungs clear to auscultation. No crackles, rhonchi or wheezing.  EXTREMITIES: No clubbing, no cyanosis nor edema.  MUSCULOSKELETAL: Normal muscle bulk and tone.  No spasticity nor rigidity.  No tremor.  SKIN:  Dry, intact. No rashes noted. Warm to touch.  LYMPH: No abnormal lymph nodes palpated in the neck region.  PSYCHIATRIC: Calm and cooperative with appropriate insight     NEUROLOGICAL EXAM:  MENTAL STATUS: Alert, awake and oriented with spontaneous fluent speech output.  CRANIAL NERVES: Pupils are equal and round.  Extraocular movements are intact. No nystagmus. Symmetric smile. Uvula and tongue is midline. No dysarthria. No dysphonia.  MOTOR STRENGTH:  Move all extremities spontaneously without gross asymmetric weakness.  COORDINATION/GAIT:  Intact station and gait with a normal arm swing and stride length. No listing or ataxia was seen.      LABORATORY STUDIES:  9/9/2011: Lyme Margarita IgG < 80, Lyme IgM interpretation nonreactive  9/10/2011: Lumbar puncture CSF WBC 8, RBC < 1, gram stain no bacterial growth, enterovirus negative, Lyme DNA not detected,   5/20/2019: " Cholesterol 224, triglyceride 105, HDL 77,   7/2/2019: TIBC 385, UIBC 230, Iron 155, Iron saturation 40%, Serum ferritin 97, Free T4 1.09, TSH 2.09, T3 127, Total Creatine Kinase 433, Vitamin B12 426.  CBC Results       05/20/19                          1237           WBC 5.8           RBC 5.22           HGB 15.8           HCT 47.7           MCV 91           MCH 30.3           MCHC 33.1                     BMP Results       05/20/19                          1237                      K 3.9           Cl 99           CO2 23           Glucose 102 (H)           BUN 11           Creatinine 1.03           EGFR 104            120            Labs were reviewed by me personally.      IMAGING STUDIES:     MRI brain with/without contrast 7/15/2019:  CLINICAL HISTORY: Restless leg syndrome  COMPARISON:  None.  COMMENT:  Technique: MRI of the brain performed without and with 8.2 cc of Gadavist  gadolinium contrast from a 10 cc single dose vial.  Ventricles and sulci are normal in size and configuration. There is no  restricted diffusion to suggest an acute infarct. There is no intraparenchymal  hemorrhage, midline shift, mass or mass-effect. There is no abnormal  enhancement. There is no extra-axial collection. The sella appears unremarkable.  The cerebellar tonsils are normal in position. Vascular flow-voids are  unremarkable. Bone marrow signal is unremarkable.  There are small maxillary  sinus and left sphenoid mucus retention cysts.  --  IMPRESSION:  No intracranial mass or acute abnormality.    MRI scan was visualized by me personally.      NEURODIAGNOSTIC STUDIES:    Routine awake/drowsy EEG 7/18/2019: Excess beta (likely from medication side effect). No focal or epileptiform features are present.      ASSESSMENT:  A 19 year old left handed male who has been having intermittent limb twitching particularly when trying to sleep after falling/hitting his head in April 2019.  Symptoms are improving after  starting a trial with gabapentin encarbil.  Suspect restless leg syndrome.  Routine lab testing did show elevated creatinine kinase of unclear etiology.  Denies any fatigue, weakness nor muscle achiness.  Reports excessive diaphoresis.      RECOMMENDATIONS:    Restless leg syndrome  - Iron studies were normal.  - Will continue gabapentin encarbil 300 mg QHS.  - Advise good sleep hygiene/habits.  - Supportive care measures including: emotional stress reduction, avoid sleep deprivation (aim to sleep 7-8 hours per night), avoid dehydration (aim to drink 7-8 glasses of water per day), avoid skipping meals (aim to eat 3 meals per day).    Elevated CK level  - Supportive care.  Advised adequate hydration particularly when exercising.  Advised against using dietary pre-workout supplements.  - Future considerations could include re-checking CK level in the future.  If still elevated and/or has clinical signs of muscle weakness/wasting, would consider checking EMG/NCS to rule out myopathy versus other neuromuscular disorder.    Diaphoresis  - Recent routine thyroid function labs were WNL.  - Advise close follow-up with PCP.  Consider endocrinological workup.    I have asked the patient to follow-up in the neurology clinic in 6 months.    Thank you for allowing me to participate in the care of your patient.  Please feel free to contact me at any time if you have any further questions.    20 minutes total face-to-face encounter time, of which greater than 50% of time was spent counseling/coordination of care.    Jaime Campos DO  Neurologist  [facility]

## 2019-08-06 DIAGNOSIS — F90.0 ATTENTION DEFICIT HYPERACTIVITY DISORDER (ADHD), PREDOMINANTLY INATTENTIVE TYPE: ICD-10-CM

## 2019-08-06 RX ORDER — DEXTROAMPHETAMINE SACCHARATE, AMPHETAMINE ASPARTATE MONOHYDRATE, DEXTROAMPHETAMINE SULFATE AND AMPHETAMINE SULFATE 5; 5; 5; 5 MG/1; MG/1; MG/1; MG/1
CAPSULE, EXTENDED RELEASE ORAL
Qty: 30 CAPSULE | Refills: 0 | Status: SHIPPED | OUTPATIENT
Start: 2019-08-06 | End: 2019-09-05 | Stop reason: SDUPTHER

## 2019-08-06 RX ORDER — DEXTROAMPHETAMINE SACCHARATE, AMPHETAMINE ASPARTATE, DEXTROAMPHETAMINE SULFATE AND AMPHETAMINE SULFATE 2.5; 2.5; 2.5; 2.5 MG/1; MG/1; MG/1; MG/1
TABLET ORAL
Qty: 30 TABLET | Refills: 0 | Status: SHIPPED | OUTPATIENT
Start: 2019-08-06 | End: 2019-09-05 | Stop reason: SDUPTHER

## 2019-09-05 DIAGNOSIS — F90.0 ATTENTION DEFICIT HYPERACTIVITY DISORDER (ADHD), PREDOMINANTLY INATTENTIVE TYPE: ICD-10-CM

## 2019-09-05 RX ORDER — DEXTROAMPHETAMINE SACCHARATE, AMPHETAMINE ASPARTATE MONOHYDRATE, DEXTROAMPHETAMINE SULFATE AND AMPHETAMINE SULFATE 5; 5; 5; 5 MG/1; MG/1; MG/1; MG/1
CAPSULE, EXTENDED RELEASE ORAL
Qty: 30 CAPSULE | Refills: 0 | Status: SHIPPED | OUTPATIENT
Start: 2019-09-05 | End: 2019-10-07 | Stop reason: SDUPTHER

## 2019-09-05 RX ORDER — DEXTROAMPHETAMINE SACCHARATE, AMPHETAMINE ASPARTATE, DEXTROAMPHETAMINE SULFATE AND AMPHETAMINE SULFATE 2.5; 2.5; 2.5; 2.5 MG/1; MG/1; MG/1; MG/1
TABLET ORAL
Qty: 30 TABLET | Refills: 0 | Status: SHIPPED | OUTPATIENT
Start: 2019-09-05 | End: 2019-10-07 | Stop reason: SDUPTHER

## 2019-10-07 DIAGNOSIS — F90.0 ATTENTION DEFICIT HYPERACTIVITY DISORDER (ADHD), PREDOMINANTLY INATTENTIVE TYPE: ICD-10-CM

## 2019-10-07 RX ORDER — DEXTROAMPHETAMINE SACCHARATE, AMPHETAMINE ASPARTATE, DEXTROAMPHETAMINE SULFATE AND AMPHETAMINE SULFATE 2.5; 2.5; 2.5; 2.5 MG/1; MG/1; MG/1; MG/1
TABLET ORAL
Qty: 30 TABLET | Refills: 0 | Status: SHIPPED | OUTPATIENT
Start: 2019-10-07 | End: 2019-11-06 | Stop reason: SDUPTHER

## 2019-10-07 RX ORDER — DEXTROAMPHETAMINE SACCHARATE, AMPHETAMINE ASPARTATE MONOHYDRATE, DEXTROAMPHETAMINE SULFATE AND AMPHETAMINE SULFATE 5; 5; 5; 5 MG/1; MG/1; MG/1; MG/1
CAPSULE, EXTENDED RELEASE ORAL
Qty: 30 CAPSULE | Refills: 0 | Status: SHIPPED | OUTPATIENT
Start: 2019-10-07 | End: 2019-11-06 | Stop reason: SDUPTHER

## 2019-11-06 DIAGNOSIS — F90.0 ATTENTION DEFICIT HYPERACTIVITY DISORDER (ADHD), PREDOMINANTLY INATTENTIVE TYPE: ICD-10-CM

## 2019-11-06 RX ORDER — DEXTROAMPHETAMINE SACCHARATE, AMPHETAMINE ASPARTATE MONOHYDRATE, DEXTROAMPHETAMINE SULFATE AND AMPHETAMINE SULFATE 5; 5; 5; 5 MG/1; MG/1; MG/1; MG/1
CAPSULE, EXTENDED RELEASE ORAL
Qty: 30 CAPSULE | Refills: 0 | Status: SHIPPED | OUTPATIENT
Start: 2019-11-06 | End: 2019-12-05 | Stop reason: SDUPTHER

## 2019-11-06 RX ORDER — DEXTROAMPHETAMINE SACCHARATE, AMPHETAMINE ASPARTATE, DEXTROAMPHETAMINE SULFATE AND AMPHETAMINE SULFATE 2.5; 2.5; 2.5; 2.5 MG/1; MG/1; MG/1; MG/1
TABLET ORAL
Qty: 30 TABLET | Refills: 0 | Status: SHIPPED | OUTPATIENT
Start: 2019-11-06 | End: 2019-12-05 | Stop reason: SDUPTHER

## 2019-12-05 DIAGNOSIS — F90.0 ATTENTION DEFICIT HYPERACTIVITY DISORDER (ADHD), PREDOMINANTLY INATTENTIVE TYPE: ICD-10-CM

## 2019-12-05 RX ORDER — DEXTROAMPHETAMINE SACCHARATE, AMPHETAMINE ASPARTATE MONOHYDRATE, DEXTROAMPHETAMINE SULFATE AND AMPHETAMINE SULFATE 5; 5; 5; 5 MG/1; MG/1; MG/1; MG/1
CAPSULE, EXTENDED RELEASE ORAL
Qty: 30 CAPSULE | Refills: 0 | Status: SHIPPED | OUTPATIENT
Start: 2019-12-05 | End: 2020-01-09 | Stop reason: SDUPTHER

## 2019-12-05 RX ORDER — DEXTROAMPHETAMINE SACCHARATE, AMPHETAMINE ASPARTATE, DEXTROAMPHETAMINE SULFATE AND AMPHETAMINE SULFATE 2.5; 2.5; 2.5; 2.5 MG/1; MG/1; MG/1; MG/1
TABLET ORAL
Qty: 30 TABLET | Refills: 0 | Status: SHIPPED | OUTPATIENT
Start: 2019-12-05 | End: 2020-01-09 | Stop reason: SDUPTHER

## 2020-01-09 ENCOUNTER — OFFICE VISIT (OUTPATIENT)
Dept: PRIMARY CARE | Facility: CLINIC | Age: 20
End: 2020-01-09
Payer: COMMERCIAL

## 2020-01-09 VITALS
TEMPERATURE: 99.1 F | WEIGHT: 183 LBS | OXYGEN SATURATION: 98 % | SYSTOLIC BLOOD PRESSURE: 120 MMHG | RESPIRATION RATE: 18 BRPM | BODY MASS INDEX: 25.62 KG/M2 | HEART RATE: 106 BPM | HEIGHT: 71 IN | DIASTOLIC BLOOD PRESSURE: 80 MMHG

## 2020-01-09 DIAGNOSIS — F90.0 ATTENTION DEFICIT HYPERACTIVITY DISORDER (ADHD), PREDOMINANTLY INATTENTIVE TYPE: ICD-10-CM

## 2020-01-09 DIAGNOSIS — G25.81 RESTLESS LEG SYNDROME: Primary | ICD-10-CM

## 2020-01-09 DIAGNOSIS — L30.9 CHRONIC ECZEMA: ICD-10-CM

## 2020-01-09 DIAGNOSIS — N34.1 NON-GONOCOCCAL URETHRITIS: ICD-10-CM

## 2020-01-09 PROCEDURE — 99214 OFFICE O/P EST MOD 30 MIN: CPT | Performed by: FAMILY MEDICINE

## 2020-01-09 RX ORDER — CEPHALEXIN 500 MG/1
500 CAPSULE ORAL 3 TIMES DAILY
Qty: 15 CAPSULE | Refills: 0 | Status: SHIPPED | OUTPATIENT
Start: 2020-01-09 | End: 2020-01-14

## 2020-01-09 RX ORDER — DEXTROAMPHETAMINE SACCHARATE, AMPHETAMINE ASPARTATE, DEXTROAMPHETAMINE SULFATE AND AMPHETAMINE SULFATE 5; 5; 5; 5 MG/1; MG/1; MG/1; MG/1
TABLET ORAL
Qty: 30 TABLET | Refills: 0 | Status: SHIPPED | OUTPATIENT
Start: 2020-01-09 | End: 2020-02-14 | Stop reason: SDUPTHER

## 2020-01-09 RX ORDER — DEXTROAMPHETAMINE SACCHARATE, AMPHETAMINE ASPARTATE MONOHYDRATE, DEXTROAMPHETAMINE SULFATE AND AMPHETAMINE SULFATE 5; 5; 5; 5 MG/1; MG/1; MG/1; MG/1
CAPSULE, EXTENDED RELEASE ORAL
Qty: 30 CAPSULE | Refills: 0 | Status: SHIPPED | OUTPATIENT
Start: 2020-01-09 | End: 2020-02-14 | Stop reason: SDUPTHER

## 2020-01-09 RX ORDER — HALOBETASOL PROPIONATE 0.5 MG/G
CREAM TOPICAL 2 TIMES DAILY
Qty: 50 G | Refills: 0 | Status: SHIPPED | OUTPATIENT
Start: 2020-01-09 | End: 2021-01-08

## 2020-01-09 ASSESSMENT — ENCOUNTER SYMPTOMS
NEUROLOGICAL NEGATIVE: 1
RESPIRATORY NEGATIVE: 1
MUSCULOSKELETAL NEGATIVE: 1
CONSTITUTIONAL NEGATIVE: 1
HEMATOLOGIC/LYMPHATIC NEGATIVE: 1
CARDIOVASCULAR NEGATIVE: 1

## 2020-01-09 NOTE — PATIENT INSTRUCTIONS
Patient Education     Attention Deficit Hyperactivity Disorder, Adult  Attention deficit hyperactivity disorder (ADHD) is a mental health disorder that starts during childhood. For many people with ADHD, the disorder continues into adult years. There are many things that you and your health care provider or therapist (mental health professional) can do to manage your symptoms.  What are the causes?  The exact cause of ADHD is not known.  What increases the risk?  You are more likely to develop this condition if:  · You have a family history of ADHD.  · You are male.  · You were born to a mother who smoked or drank alcohol during pregnancy.  · You were exposed to lead poisoning or other toxins in the womb or in early life.  · You were born before 37 weeks of pregnancy (prematurely) or you had a low birth weight.  · You have experienced a brain injury.  What are the signs or symptoms?  Symptoms of this condition depend on the type of ADHD. The two main types are inattentive and hyperactive-impulsive. Some people may have symptoms of both types.  Symptoms of the inattentive type include:  · Difficulty watching, listening, or thinking with focused effort (paying attention).  · Making careless mistakes.  · Not listening.  · Not following instructions.  · Being disorganized.  · Avoiding tasks that require time and attention.  · Losing things.  · Forgetting things.  · Being easily distracted.  Symptoms of the hyperactive-impulsive type include:  · Restlessness.  · Talking too much.  · Interrupting.  · Difficulty with:  ? Sitting still.  ? Staying quiet.  ? Feeling motivated.  ? Relaxing.  ? Waiting in line or waiting for a turn.  How is this diagnosed?  This condition is diagnosed based on your current symptoms and your history of symptoms. The diagnosis can be made by a provider such as a primary care provider, psychiatrist, psychologist, or clinical . The provider may use a symptom checklist or a  standardized behavior rating scale to evaluate your symptoms. He or she may want to talk with family members who have known you for a long time and have observed your behaviors.  There are no lab tests or brain imaging tests that can diagnose ADHD.  How is this treated?  This condition can be treated with medicines and behavior therapy. Medicines may be the best option to reduce impulsive behaviors and improve attention. Your health care provider may recommend:  · Stimulant medicines. These are the most common medicines used for adult ADHD. They affect certain chemicals in the brain (neurotransmitters). These medicines may be long-acting or short-acting. This will determine how often you need to take the medicine.  · A non-stimulant medicine for adult ADHD (atomoxetine). This medicine increases a neurotransmitter called norepinephrine. It may take weeks to months to see effects from this medicine.  Psychotherapy and behavioral management are also important for treating ADHD. Psychotherapy is often used along with medicine. Your health care provider may suggest:  · Cognitive behavioral therapy (CBT). This type of therapy teaches you to replace negative thoughts and actions with positive thoughts and actions. When used as part of ADHD treatment, this therapy may also include:  ? Coping strategies for organization, time management, impulse control, and stress reduction.  ? Mindfulness and meditation training.  · Behavioral management. This may include strategies for organization and time management. You may work with an ADHD  who is specially trained to help people with ADHD to manage and organize activities and to function more effectively.  Follow these instructions at home:  Medicines    · Take over-the-counter and prescription medicines only as told by your health care provider.  · Talk with your health care provider about the possible side effects of your medicine to watch for.  General  instructions    · Learn as much as you can about adult ADHD, and work closely with your health care providers to find the treatments that work best for you.  · Do not use drugs or abuse alcohol. Limit alcohol intake to no more than 1 drink a day for nonpregnant women and 2 drinks a day for men. One drink equals 12 oz of beer, 5 oz of wine, or 1½ oz of hard liquor.  · Follow the same schedule each day. Make sure your schedule includes enough time for you to get plenty of sleep.  · Use reminder devices like notes, calendars, and phone apps to stay on-time and organized.  · Eat a healthy diet. Do not skip meals.  · Exercise regularly. Exercise can help to reduce stress and anxiety.  · Keep all follow-up visits as told by your health care provider and therapist. This is important.  Where to find more information  · A health care provider may be able to recommend resources that are available online or over the phone. You could start with:  ? Attention Deficit Disorder Association (ADDA): www.add.org  ? National [place] of Mental Health (NIMH): www.nimh.nih.gov  Contact a health care provider if:  · Your symptoms are changing, getting worse, or not improving.  · You have side effects from your medicine, such as:  ? Repeated muscle twitches, coughing, or speech outbursts.  ? Sleep problems.  ? Loss of appetite.  ? Depression.  ? New or worsening behavior problems.  ? Dizziness.  ? Unusually fast heartbeat.  ? Stomach pains.  ? Headaches.  · You are struggling with anxiety, depression, or substance abuse.  Get help right away if:  · You have a severe reaction to a medicine.  · You have thoughts of hurting yourself or others.  If you ever feel like you may hurt yourself or others, or have thoughts about taking your own life, get help right away. You can go to the nearest emergency department or call:  · Your local emergency services (911 in the [place]  · A suicide crisis helpline, such as the National Suicide Prevention  [facility] at 1-126.619.5378. This is open 24 hours a day.  Summary  · ADHD is a mental health disorder that starts during childhood and often continues into adult years.  · The exact cause of ADHD is not known.  · There is no cure for ADHD, but treatment with medicine, therapy, or behavioral training can help you manage your condition.  This information is not intended to replace advice given to you by your health care provider. Make sure you discuss any questions you have with your health care provider.  Document Released: 08/09/2018 Document Revised: 08/09/2018 Document Reviewed: 08/09/2018  Elsevier Interactive Patient Education © 2019 [employer] Inc.

## 2020-01-09 NOTE — PROGRESS NOTES
Subjective      Patient ID: Kevin Almanza is a 19 y.o. male.  2000      HPI    Med Refill (pt would like refill on adderall. would like to discuss dosage )   Rash (pt states he has rash on his arm that has been spreading. state he has had it for a long time but it starting to get worse. states when it is hot outside it it worse than normal. also has rash on neck and groin. )   Does not take gabapentin any more.    The following have been reviewed and updated as appropriate in this visit:  Tobacco  Allergies  Meds  Med Hx  Surg Hx  Fam Hx  Soc Hx      Review of Systems   Constitutional: Negative.    HENT: Negative.    Respiratory: Negative.    Cardiovascular: Negative.    Genitourinary: Negative.    Musculoskeletal: Negative.    Neurological: Negative.    Hematological: Negative.      Active Ambulatory Problems     Diagnosis Date Noted   • Attention deficit hyperactivity disorder (ADHD), predominantly inattentive type 08/08/2016   • Visit for well [facility] check 01/10/2019   • Hyperhidrosis 01/10/2019   • Gastroesophageal reflux disease with esophagitis 01/10/2019   • Screening, lipid 01/10/2019   • Screening for diabetes mellitus (DM) 01/10/2019   • Restless leg syndrome 05/16/2019   • Insomnia 05/16/2019   • Movement disorder 06/06/2019   • Elevated creatine kinase 07/30/2019     Resolved Ambulatory Problems     Diagnosis Date Noted   • No Resolved Ambulatory Problems     Past Medical History:   Diagnosis Date   • ADD (attention deficit disorder)    • Lipid disorder        Current Outpatient Medications:   •  amphetamine-dextroamphetamine XR (ADDERALL XR) 20 mg 24 hr capsule, Take one capsule by oral route every day in the morning upon awakening, Disp: 30 capsule, Rfl: 0  •  dextroamphetamine-amphetamine (ADDERALL) 10 mg tablet, Take one tablet by oral route every day before breakfast, Disp: 30 tablet, Rfl: 0  •  gabapentin enacarbil 300 mg tablet extended release, Take 300 mg by mouth nightly.  "(Patient not taking: Reported on 1/9/2020 ), Disp: 30 tablet, Rfl: 3  No Known Allergies  Social History     Socioeconomic History   • Marital status: Single     Spouse name: None   • Number of children: None   • Years of education: None   • Highest education level: None   Occupational History   • None   Social Needs   • Financial resource strain: None   • Food insecurity:     Worry: None     Inability: None   • Transportation needs:     Medical: None     Non-medical: None   Tobacco Use   • Smoking status: Never Smoker   • Smokeless tobacco: Never Used   Substance and Sexual Activity   • Alcohol use: Yes     Comment: socially   • Drug use: No   • Sexual activity: Yes   Lifestyle   • Physical activity:     Days per week: None     Minutes per session: None   • Stress: None   Relationships   • Social connections:     Talks on phone: None     Gets together: None     Attends Orthodox service: None     Active member of club or organization: None     Attends meetings of clubs or organizations: None     Relationship status: None   • Intimate partner violence:     Fear of current or ex partner: None     Emotionally abused: None     Physically abused: None     Forced sexual activity: None   Other Topics Concern   • None   Social History Narrative    In college    Single    No kids     feel safe at home      Family History   Family history unknown: Yes     History reviewed. No pertinent surgical history.    Objective     Vitals:    01/09/20 0851   BP: 120/80   Pulse: (!) 106   Resp: 18   Temp: 37.3 °C (99.1 °F)   SpO2: 98%   Weight: 83 kg (183 lb)   Height: 1.803 m (5' 11\")     Body mass index is 25.52 kg/m².    Physical Exam   Constitutional: He appears well-developed and well-nourished.   HENT:   Head: Normocephalic and atraumatic.   Eyes: Pupils are equal, round, and reactive to light.   Cardiovascular: Normal rate and regular rhythm.   Pulmonary/Chest: Effort normal.   Abdominal: Soft.   Musculoskeletal: Normal range of " motion.   Neurological: He is alert.   Nursing note and vitals reviewed.      Assessment/Plan   Diagnoses and all orders for this visit:    Attention deficit hyperactivity disorder (ADHD), predominantly inattentive type  -     amphetamine-dextroamphetamine XR (ADDERALL XR) 20 mg 24 hr capsule; Take one capsule by oral route every day in the morning upon awakening  -     dextroamphetamine-amphetamine (ADDERALL) 10 mg tablet; Take one tablet by oral route every day before breakfast

## 2020-02-14 DIAGNOSIS — F90.0 ATTENTION DEFICIT HYPERACTIVITY DISORDER (ADHD), PREDOMINANTLY INATTENTIVE TYPE: ICD-10-CM

## 2020-02-14 NOTE — TELEPHONE ENCOUNTER
Medicine Refill Request    Last Office Visit: 1/9/2020  Next Office Visit: Visit date not found        Current Outpatient Medications:   •  amphetamine-dextroamphetamine XR (ADDERALL XR) 20 mg 24 hr capsule, Take one capsule by oral route every day in the morning upon awakening, Disp: 30 capsule, Rfl: 0  •  dextroamphetamine-amphetamine (ADDERALL) 20 mg tablet, Take one tablet by oral route every day before breakfast, Disp: 30 tablet, Rfl: 0  •  gabapentin enacarbil 300 mg tablet extended release, Take 300 mg by mouth nightly. (Patient not taking: Reported on 1/9/2020 ), Disp: 30 tablet, Rfl: 3  •  halobetasol (ULTRAVATE) 0.05 % cream, Apply topically 2 (two) times a day., Disp: 50 g, Rfl: 0      BP Readings from Last 3 Encounters:   01/09/20 120/80   07/30/19 120/68   06/27/19 124/70       Recent Lab results:  Lab Results   Component Value Date    CHOL 224 (H) 05/20/2019   ,   Lab Results   Component Value Date    HDL 77 05/20/2019   ,   Lab Results   Component Value Date    LDLCALC 126 (H) 05/20/2019   ,   Lab Results   Component Value Date    TRIG 105 05/20/2019        Lab Results   Component Value Date    GLUCOSE 102 (H) 05/20/2019   , No results found for: HGBA1C      Lab Results   Component Value Date    CREATININE 1.03 05/20/2019       No results found for: TSH

## 2020-02-17 RX ORDER — DEXTROAMPHETAMINE SACCHARATE, AMPHETAMINE ASPARTATE MONOHYDRATE, DEXTROAMPHETAMINE SULFATE AND AMPHETAMINE SULFATE 5; 5; 5; 5 MG/1; MG/1; MG/1; MG/1
CAPSULE, EXTENDED RELEASE ORAL
Qty: 30 CAPSULE | Refills: 0 | Status: SHIPPED | OUTPATIENT
Start: 2020-02-17 | End: 2020-03-19 | Stop reason: SDUPTHER

## 2020-02-17 RX ORDER — DEXTROAMPHETAMINE SACCHARATE, AMPHETAMINE ASPARTATE, DEXTROAMPHETAMINE SULFATE AND AMPHETAMINE SULFATE 5; 5; 5; 5 MG/1; MG/1; MG/1; MG/1
TABLET ORAL
Qty: 30 TABLET | Refills: 0 | Status: SHIPPED | OUTPATIENT
Start: 2020-02-17 | End: 2020-03-19 | Stop reason: SDUPTHER

## 2020-03-19 DIAGNOSIS — F90.0 ATTENTION DEFICIT HYPERACTIVITY DISORDER (ADHD), PREDOMINANTLY INATTENTIVE TYPE: ICD-10-CM

## 2020-03-19 RX ORDER — DEXTROAMPHETAMINE SACCHARATE, AMPHETAMINE ASPARTATE MONOHYDRATE, DEXTROAMPHETAMINE SULFATE AND AMPHETAMINE SULFATE 5; 5; 5; 5 MG/1; MG/1; MG/1; MG/1
CAPSULE, EXTENDED RELEASE ORAL
Qty: 30 CAPSULE | Refills: 0 | Status: SHIPPED | OUTPATIENT
Start: 2020-03-19 | End: 2020-04-23 | Stop reason: SDUPTHER

## 2020-03-19 RX ORDER — DEXTROAMPHETAMINE SACCHARATE, AMPHETAMINE ASPARTATE, DEXTROAMPHETAMINE SULFATE AND AMPHETAMINE SULFATE 5; 5; 5; 5 MG/1; MG/1; MG/1; MG/1
TABLET ORAL
Qty: 30 TABLET | Refills: 0 | Status: SHIPPED | OUTPATIENT
Start: 2020-03-19 | End: 2020-04-23 | Stop reason: SDUPTHER

## 2020-04-23 DIAGNOSIS — F90.0 ATTENTION DEFICIT HYPERACTIVITY DISORDER (ADHD), PREDOMINANTLY INATTENTIVE TYPE: ICD-10-CM

## 2020-04-23 RX ORDER — DEXTROAMPHETAMINE SACCHARATE, AMPHETAMINE ASPARTATE MONOHYDRATE, DEXTROAMPHETAMINE SULFATE AND AMPHETAMINE SULFATE 5; 5; 5; 5 MG/1; MG/1; MG/1; MG/1
CAPSULE, EXTENDED RELEASE ORAL
Qty: 30 CAPSULE | Refills: 0 | Status: SHIPPED | OUTPATIENT
Start: 2020-04-23 | End: 2020-05-20 | Stop reason: SDUPTHER

## 2020-04-23 RX ORDER — DEXTROAMPHETAMINE SACCHARATE, AMPHETAMINE ASPARTATE, DEXTROAMPHETAMINE SULFATE AND AMPHETAMINE SULFATE 5; 5; 5; 5 MG/1; MG/1; MG/1; MG/1
TABLET ORAL
Qty: 30 TABLET | Refills: 0 | Status: SHIPPED | OUTPATIENT
Start: 2020-04-23 | End: 2020-05-20 | Stop reason: SDUPTHER

## 2020-05-20 DIAGNOSIS — F90.0 ATTENTION DEFICIT HYPERACTIVITY DISORDER (ADHD), PREDOMINANTLY INATTENTIVE TYPE: ICD-10-CM

## 2020-05-20 RX ORDER — DEXTROAMPHETAMINE SACCHARATE, AMPHETAMINE ASPARTATE MONOHYDRATE, DEXTROAMPHETAMINE SULFATE AND AMPHETAMINE SULFATE 5; 5; 5; 5 MG/1; MG/1; MG/1; MG/1
CAPSULE, EXTENDED RELEASE ORAL
Qty: 30 CAPSULE | Refills: 0 | Status: SHIPPED | OUTPATIENT
Start: 2020-05-20 | End: 2020-06-04 | Stop reason: SDUPTHER

## 2020-05-20 RX ORDER — DEXTROAMPHETAMINE SACCHARATE, AMPHETAMINE ASPARTATE, DEXTROAMPHETAMINE SULFATE AND AMPHETAMINE SULFATE 5; 5; 5; 5 MG/1; MG/1; MG/1; MG/1
TABLET ORAL
Qty: 30 TABLET | Refills: 0 | Status: SHIPPED | OUTPATIENT
Start: 2020-05-20 | End: 2020-05-25 | Stop reason: SDUPTHER

## 2020-05-22 ENCOUNTER — TELEPHONE (OUTPATIENT)
Dept: PRIMARY CARE | Facility: CLINIC | Age: 20
End: 2020-05-22

## 2020-05-22 DIAGNOSIS — F90.0 ATTENTION DEFICIT HYPERACTIVITY DISORDER (ADHD), PREDOMINANTLY INATTENTIVE TYPE: ICD-10-CM

## 2020-05-22 RX ORDER — DEXTROAMPHETAMINE SACCHARATE, AMPHETAMINE ASPARTATE, DEXTROAMPHETAMINE SULFATE AND AMPHETAMINE SULFATE 5; 5; 5; 5 MG/1; MG/1; MG/1; MG/1
TABLET ORAL
Qty: 30 TABLET | Refills: 0 | Status: CANCELLED | OUTPATIENT
Start: 2020-05-22

## 2020-05-22 RX ORDER — DEXTROAMPHETAMINE SACCHARATE, AMPHETAMINE ASPARTATE MONOHYDRATE, DEXTROAMPHETAMINE SULFATE AND AMPHETAMINE SULFATE 5; 5; 5; 5 MG/1; MG/1; MG/1; MG/1
CAPSULE, EXTENDED RELEASE ORAL
Qty: 30 CAPSULE | Refills: 0 | Status: CANCELLED | OUTPATIENT
Start: 2020-05-22

## 2020-05-22 NOTE — TELEPHONE ENCOUNTER
patietns adderall was sent to pharmacy in [place], he is home so he needs it resent to [facility] in [place] on file

## 2020-05-25 DIAGNOSIS — F90.0 ATTENTION DEFICIT HYPERACTIVITY DISORDER (ADHD), PREDOMINANTLY INATTENTIVE TYPE: ICD-10-CM

## 2020-05-25 RX ORDER — DEXTROAMPHETAMINE SACCHARATE, AMPHETAMINE ASPARTATE, DEXTROAMPHETAMINE SULFATE AND AMPHETAMINE SULFATE 5; 5; 5; 5 MG/1; MG/1; MG/1; MG/1
TABLET ORAL
Qty: 30 TABLET | Refills: 0 | Status: SHIPPED | OUTPATIENT
Start: 2020-05-25 | End: 2020-06-08

## 2020-06-04 DIAGNOSIS — F90.0 ATTENTION DEFICIT HYPERACTIVITY DISORDER (ADHD), PREDOMINANTLY INATTENTIVE TYPE: ICD-10-CM

## 2020-06-04 RX ORDER — DEXTROAMPHETAMINE SACCHARATE, AMPHETAMINE ASPARTATE MONOHYDRATE, DEXTROAMPHETAMINE SULFATE AND AMPHETAMINE SULFATE 5; 5; 5; 5 MG/1; MG/1; MG/1; MG/1
CAPSULE, EXTENDED RELEASE ORAL
Qty: 30 CAPSULE | Refills: 0 | Status: SHIPPED | OUTPATIENT
Start: 2020-06-04 | End: 2020-06-08

## 2020-06-04 NOTE — TELEPHONE ENCOUNTER
Patient needs the adderall XR 20mg capsules resent to local [facility] on file. The tablets were sent but not the capsules

## 2020-06-08 DIAGNOSIS — F90.0 ATTENTION DEFICIT HYPERACTIVITY DISORDER (ADHD), PREDOMINANTLY INATTENTIVE TYPE: ICD-10-CM

## 2020-06-08 RX ORDER — DEXTROAMPHETAMINE SACCHARATE, AMPHETAMINE ASPARTATE MONOHYDRATE, DEXTROAMPHETAMINE SULFATE AND AMPHETAMINE SULFATE 5; 5; 5; 5 MG/1; MG/1; MG/1; MG/1
CAPSULE, EXTENDED RELEASE ORAL
Qty: 30 CAPSULE | Refills: 0 | Status: SHIPPED | OUTPATIENT
Start: 2020-06-08 | End: 2020-07-13

## 2020-06-08 NOTE — TELEPHONE ENCOUNTER
Resubmission, [facility] pharm saying the 6/4/20 refill not received even though this office has confirmation they did.

## 2020-07-13 ENCOUNTER — OFFICE VISIT (OUTPATIENT)
Dept: PRIMARY CARE | Facility: CLINIC | Age: 20
End: 2020-07-13
Payer: COMMERCIAL

## 2020-07-13 VITALS
BODY MASS INDEX: 24.92 KG/M2 | OXYGEN SATURATION: 99 % | HEART RATE: 110 BPM | SYSTOLIC BLOOD PRESSURE: 140 MMHG | WEIGHT: 178 LBS | HEIGHT: 71 IN | DIASTOLIC BLOOD PRESSURE: 78 MMHG | TEMPERATURE: 97.8 F | RESPIRATION RATE: 18 BRPM

## 2020-07-13 DIAGNOSIS — R21 SKIN RASH: ICD-10-CM

## 2020-07-13 DIAGNOSIS — F90.0 ATTENTION DEFICIT HYPERACTIVITY DISORDER (ADHD), PREDOMINANTLY INATTENTIVE TYPE: ICD-10-CM

## 2020-07-13 DIAGNOSIS — B36.0 PITYRIASIS VERSICOLOR: Primary | ICD-10-CM

## 2020-07-13 PROCEDURE — 99214 OFFICE O/P EST MOD 30 MIN: CPT | Performed by: FAMILY MEDICINE

## 2020-07-13 RX ORDER — DEXTROAMPHETAMINE SACCHARATE, AMPHETAMINE ASPARTATE, DEXTROAMPHETAMINE SULFATE AND AMPHETAMINE SULFATE 5; 5; 5; 5 MG/1; MG/1; MG/1; MG/1
20 TABLET ORAL DAILY
Qty: 30 TABLET | Refills: 0 | Status: SHIPPED | OUTPATIENT
Start: 2020-07-13 | End: 2020-08-17 | Stop reason: SDUPTHER

## 2020-07-13 RX ORDER — SELENIUM SULFIDE 2.5 MG/100ML
LOTION TOPICAL
Qty: 1 BOTTLE | Refills: 1 | Status: SHIPPED | OUTPATIENT
Start: 2020-07-13

## 2020-07-13 RX ORDER — DEXTROAMPHETAMINE SACCHARATE, AMPHETAMINE ASPARTATE MONOHYDRATE, DEXTROAMPHETAMINE SULFATE AND AMPHETAMINE SULFATE 5; 5; 5; 5 MG/1; MG/1; MG/1; MG/1
CAPSULE, EXTENDED RELEASE ORAL
Qty: 30 CAPSULE | Refills: 0
Start: 2020-07-13 | End: 2020-08-17 | Stop reason: SDUPTHER

## 2020-07-13 RX ORDER — CEPHALEXIN 500 MG/1
CAPSULE ORAL
COMMUNITY
Start: 2020-07-11

## 2020-07-13 RX ORDER — MUPIROCIN 20 MG/G
OINTMENT TOPICAL
COMMUNITY
Start: 2020-07-11

## 2020-07-13 ASSESSMENT — ENCOUNTER SYMPTOMS
HEMATOLOGIC/LYMPHATIC NEGATIVE: 1
CARDIOVASCULAR NEGATIVE: 1
GASTROINTESTINAL NEGATIVE: 1
MUSCULOSKELETAL NEGATIVE: 1
EYES NEGATIVE: 1
CONSTITUTIONAL NEGATIVE: 1
NEUROLOGICAL NEGATIVE: 1

## 2020-07-13 NOTE — PATIENT INSTRUCTIONS
Patient Education     Tinea Versicolor    Tinea versicolor is a skin infection. It is caused by a type of yeast. It is normal for some yeast to be on your skin, but too much yeast causes this infection.  The infection causes a rash of light or dark patches on your skin. The rash is most common on the chest, back, neck, or upper arms. The infection usually does not cause other problems. If it is treated, it will probably go away in a few weeks. The infection cannot be spread from one person to another (is not contagious).  Follow these instructions at home:  · Use over-the-counter and prescription medicines only as told by your doctor.  · Scrub your skin every day with dandruff shampoo as told by your doctor.  · Do not scratch your skin in the rash area.  · Avoid places that are hot and humid.  · Do not use tanning booths.  · Try to avoid sweating a lot.  Contact a doctor if:  · Your symptoms get worse.  · You have a fever.  · You have redness, swelling, or pain in the rash area.  · You have fluid or blood coming from your rash.  · Your rash feels warm to the touch.  · You have pus or a bad smell coming from your rash.  · Your rash comes back (recurs) after treatment.  Summary  · Tinea versicolor is a skin infection. It causes a rash of light or dark patches on your skin.  · The rash is most common on the chest, back, neck, or upper arms. This infection usually does not cause other problems.  · Use over-the-counter and prescription medicines only as told by your doctor.  · If the infection is treated, it will probably go away in a few weeks.  This information is not intended to replace advice given to you by your health care provider. Make sure you discuss any questions you have with your health care provider.  Document Released: 11/30/2009 Document Revised: 08/20/2018 Document Reviewed: 08/20/2018  Elsevier Interactive Patient Education © 2019 [employer]

## 2020-07-13 NOTE — PROGRESS NOTES
Subjective      Patient ID: Kevin Almanza is a 20 y.o. male.  2000      HPI Impetigo (pt c/o impetigo on arms and neck. states he has had it for a while now. c/o itching and redness. denies any pains. )    The following have been reviewed and updated as appropriate in this visit:       Review of Systems   Constitutional: Negative.    HENT: Negative.    Eyes: Negative.    Cardiovascular: Negative.    Gastrointestinal: Negative.    Genitourinary: Negative.    Musculoskeletal: Negative.    Skin: Positive for rash.   Neurological: Negative.    Hematological: Negative.        Active Ambulatory Problems     Diagnosis Date Noted   • Attention deficit hyperactivity disorder (ADHD), predominantly inattentive type 08/08/2016   • Visit for well man health check 01/10/2019   • Hyperhidrosis 01/10/2019   • Gastroesophageal reflux disease with esophagitis 01/10/2019   • Screening, lipid 01/10/2019   • Screening for diabetes mellitus (DM) 01/10/2019   • Restless leg syndrome 05/16/2019   • Insomnia 05/16/2019   • Movement disorder 06/06/2019   • Elevated creatine kinase 07/30/2019   • Non-gonococcal urethritis 01/09/2020   • Chronic eczema 01/09/2020   • Pityriasis versicolor 07/13/2020   • Skin rash 07/13/2020     Resolved Ambulatory Problems     Diagnosis Date Noted   • No Resolved Ambulatory Problems     Past Medical History:   Diagnosis Date   • ADD (attention deficit disorder)    • Lipid disorder        Current Outpatient Medications:   •  amphetamine-dextroamphetamine XR (ADDERALL XR) 20 mg 24 hr capsule, Take one capsule by oral route every day in the morning upon awakening, Disp: 30 capsule, Rfl: 0  •  gabapentin enacarbil 300 mg tablet extended release, Take 300 mg by mouth nightly., Disp: 30 tablet, Rfl: 3  •  halobetasol (ULTRAVATE) 0.05 % cream, Apply topically 2 (two) times a day., Disp: 50 g, Rfl: 0  •  cephalexin (KEFLEX) 500 mg capsule, , Disp: , Rfl:   •  dextroamphetamine-amphetamine (ADDERALL) 20 mg  "tablet, Take 1 tablet (20 mg total) by mouth daily., Disp: 30 tablet, Rfl: 0  •  mupirocin (BACTROBAN) 2 % ointment, , Disp: , Rfl:   •  selenium sulfide 2.5 % lotion, Apply every 3 days as directed, Disp: 1 Bottle, Rfl: 1  No Known Allergies  Social History     Socioeconomic History   • Marital status: Single     Spouse name: None   • Number of children: None   • Years of education: None   • Highest education level: None   Occupational History   • None   Social Needs   • Financial resource strain: None   • Food insecurity:     Worry: None     Inability: None   • Transportation needs:     Medical: None     Non-medical: None   Tobacco Use   • Smoking status: Never Smoker   • Smokeless tobacco: Never Used   Substance and Sexual Activity   • Alcohol use: Yes     Comment: socially   • Drug use: No   • Sexual activity: Yes   Lifestyle   • Physical activity:     Days per week: None     Minutes per session: None   • Stress: None   Relationships   • Social connections:     Talks on phone: None     Gets together: None     Attends Oriental orthodox service: None     Active member of club or organization: None     Attends meetings of clubs or organizations: None     Relationship status: None   • Intimate partner violence:     Fear of current or ex partner: None     Emotionally abused: None     Physically abused: None     Forced sexual activity: None   Other Topics Concern   • None   Social History Narrative    In college    Single    No kids     feel safe at home      Family History   Family history unknown: Yes     History reviewed. No pertinent surgical history.    Objective     Vitals:    07/13/20 1008   BP: 140/78   Pulse: (!) 110   Resp: 18   Temp: 36.6 °C (97.8 °F)   SpO2: 99%   Weight: 80.7 kg (178 lb)   Height: 1.803 m (5' 11\")     Body mass index is 24.83 kg/m².    Physical Exam   Constitutional: He is oriented to person, place, and time. He appears well-developed and well-nourished.   HENT:   Head: Normocephalic and " atraumatic.   Eyes: Pupils are equal, round, and reactive to light.   Cardiovascular: Normal rate and regular rhythm.   Pulmonary/Chest: Effort normal.   Abdominal: Soft.   Neurological: He is oriented to person, place, and time.   Skin: Skin is warm.   Circular patches consistent with pityriasis versicolor.   Nursing note and vitals reviewed.      Assessment/Plan   Diagnoses and all orders for this visit:    Pityriasis versicolor (Primary)  Comments:  see treatment  Orders:  -     selenium sulfide 2.5 % lotion; Apply every 3 days as directed    Skin rash  Comments:  as above.  Orders:  -     selenium sulfide 2.5 % lotion; Apply every 3 days as directed    Attention deficit hyperactivity disorder (ADHD), predominantly inattentive type  -     amphetamine-dextroamphetamine XR (ADDERALL XR) 20 mg 24 hr capsule; Take one capsule by oral route every day in the morning upon awakening  -     dextroamphetamine-amphetamine (ADDERALL) 20 mg tablet; Take 1 tablet (20 mg total) by mouth daily.

## 2020-08-17 ENCOUNTER — TELEPHONE (OUTPATIENT)
Dept: PRIMARY CARE | Facility: CLINIC | Age: 20
End: 2020-08-17

## 2020-08-17 DIAGNOSIS — F90.0 ATTENTION DEFICIT HYPERACTIVITY DISORDER (ADHD), PREDOMINANTLY INATTENTIVE TYPE: ICD-10-CM

## 2020-08-17 RX ORDER — DEXTROAMPHETAMINE SACCHARATE, AMPHETAMINE ASPARTATE MONOHYDRATE, DEXTROAMPHETAMINE SULFATE AND AMPHETAMINE SULFATE 5; 5; 5; 5 MG/1; MG/1; MG/1; MG/1
CAPSULE, EXTENDED RELEASE ORAL
Qty: 30 CAPSULE | Refills: 0 | Status: SHIPPED | OUTPATIENT
Start: 2020-08-17 | End: 2020-09-16 | Stop reason: SDUPTHER

## 2020-08-17 RX ORDER — DEXTROAMPHETAMINE SACCHARATE, AMPHETAMINE ASPARTATE, DEXTROAMPHETAMINE SULFATE AND AMPHETAMINE SULFATE 5; 5; 5; 5 MG/1; MG/1; MG/1; MG/1
20 TABLET ORAL DAILY
Qty: 30 TABLET | Refills: 0 | Status: SHIPPED | OUTPATIENT
Start: 2020-08-17 | End: 2020-09-16 | Stop reason: SDUPTHER

## 2020-09-16 DIAGNOSIS — F90.0 ATTENTION DEFICIT HYPERACTIVITY DISORDER (ADHD), PREDOMINANTLY INATTENTIVE TYPE: ICD-10-CM

## 2020-09-16 RX ORDER — DEXTROAMPHETAMINE SACCHARATE, AMPHETAMINE ASPARTATE MONOHYDRATE, DEXTROAMPHETAMINE SULFATE AND AMPHETAMINE SULFATE 5; 5; 5; 5 MG/1; MG/1; MG/1; MG/1
CAPSULE, EXTENDED RELEASE ORAL
Qty: 30 CAPSULE | Refills: 0 | Status: SHIPPED | OUTPATIENT
Start: 2020-09-16 | End: 2020-10-20 | Stop reason: SDUPTHER

## 2020-09-16 RX ORDER — DEXTROAMPHETAMINE SACCHARATE, AMPHETAMINE ASPARTATE, DEXTROAMPHETAMINE SULFATE AND AMPHETAMINE SULFATE 5; 5; 5; 5 MG/1; MG/1; MG/1; MG/1
20 TABLET ORAL DAILY
Qty: 30 TABLET | Refills: 0 | Status: SHIPPED | OUTPATIENT
Start: 2020-09-16 | End: 2020-10-20 | Stop reason: SDUPTHER

## 2020-10-20 DIAGNOSIS — F90.0 ATTENTION DEFICIT HYPERACTIVITY DISORDER (ADHD), PREDOMINANTLY INATTENTIVE TYPE: ICD-10-CM

## 2020-10-20 RX ORDER — DEXTROAMPHETAMINE SACCHARATE, AMPHETAMINE ASPARTATE MONOHYDRATE, DEXTROAMPHETAMINE SULFATE AND AMPHETAMINE SULFATE 5; 5; 5; 5 MG/1; MG/1; MG/1; MG/1
CAPSULE, EXTENDED RELEASE ORAL
Qty: 30 CAPSULE | Refills: 0 | Status: SHIPPED | OUTPATIENT
Start: 2020-10-20 | End: 2020-11-16 | Stop reason: SDUPTHER

## 2020-10-20 RX ORDER — DEXTROAMPHETAMINE SACCHARATE, AMPHETAMINE ASPARTATE, DEXTROAMPHETAMINE SULFATE AND AMPHETAMINE SULFATE 5; 5; 5; 5 MG/1; MG/1; MG/1; MG/1
20 TABLET ORAL DAILY
Qty: 30 TABLET | Refills: 0 | Status: SHIPPED | OUTPATIENT
Start: 2020-10-20 | End: 2020-11-16 | Stop reason: SDUPTHER

## 2020-11-16 DIAGNOSIS — F90.0 ATTENTION DEFICIT HYPERACTIVITY DISORDER (ADHD), PREDOMINANTLY INATTENTIVE TYPE: ICD-10-CM

## 2020-11-16 RX ORDER — DEXTROAMPHETAMINE SACCHARATE, AMPHETAMINE ASPARTATE, DEXTROAMPHETAMINE SULFATE AND AMPHETAMINE SULFATE 5; 5; 5; 5 MG/1; MG/1; MG/1; MG/1
20 TABLET ORAL DAILY
Qty: 30 TABLET | Refills: 0 | Status: SHIPPED | OUTPATIENT
Start: 2020-11-16 | End: 2021-01-18 | Stop reason: SDUPTHER

## 2020-11-16 RX ORDER — DEXTROAMPHETAMINE SACCHARATE, AMPHETAMINE ASPARTATE MONOHYDRATE, DEXTROAMPHETAMINE SULFATE AND AMPHETAMINE SULFATE 5; 5; 5; 5 MG/1; MG/1; MG/1; MG/1
CAPSULE, EXTENDED RELEASE ORAL
Qty: 30 CAPSULE | Refills: 0 | Status: SHIPPED | OUTPATIENT
Start: 2020-11-16 | End: 2021-01-18 | Stop reason: SDUPTHER

## 2021-01-18 DIAGNOSIS — F90.0 ATTENTION DEFICIT HYPERACTIVITY DISORDER (ADHD), PREDOMINANTLY INATTENTIVE TYPE: ICD-10-CM

## 2021-01-18 RX ORDER — DEXTROAMPHETAMINE SACCHARATE, AMPHETAMINE ASPARTATE, DEXTROAMPHETAMINE SULFATE AND AMPHETAMINE SULFATE 5; 5; 5; 5 MG/1; MG/1; MG/1; MG/1
20 TABLET ORAL DAILY
Qty: 30 TABLET | Refills: 0 | Status: SHIPPED | OUTPATIENT
Start: 2021-01-18 | End: 2021-02-24 | Stop reason: SDUPTHER

## 2021-01-18 RX ORDER — DEXTROAMPHETAMINE SACCHARATE, AMPHETAMINE ASPARTATE MONOHYDRATE, DEXTROAMPHETAMINE SULFATE AND AMPHETAMINE SULFATE 5; 5; 5; 5 MG/1; MG/1; MG/1; MG/1
CAPSULE, EXTENDED RELEASE ORAL
Qty: 30 CAPSULE | Refills: 0 | Status: SHIPPED | OUTPATIENT
Start: 2021-01-18 | End: 2021-02-24 | Stop reason: SDUPTHER

## 2021-01-18 NOTE — TELEPHONE ENCOUNTER
Pt needs a refill of the following:     amphetamine-dextroamphetamine XR (ADDERALL XR) 20 mg 24 hr capsule    dextroamphetamine-amphetamine (ADDERALL) 20 mg tablet ()    [phone]

## 2021-02-24 DIAGNOSIS — F90.0 ATTENTION DEFICIT HYPERACTIVITY DISORDER (ADHD), PREDOMINANTLY INATTENTIVE TYPE: ICD-10-CM

## 2021-02-24 RX ORDER — DEXTROAMPHETAMINE SACCHARATE, AMPHETAMINE ASPARTATE MONOHYDRATE, DEXTROAMPHETAMINE SULFATE AND AMPHETAMINE SULFATE 5; 5; 5; 5 MG/1; MG/1; MG/1; MG/1
CAPSULE, EXTENDED RELEASE ORAL
Qty: 30 CAPSULE | Refills: 0 | Status: SHIPPED | OUTPATIENT
Start: 2021-02-24 | End: 2021-03-25 | Stop reason: SDUPTHER

## 2021-02-24 RX ORDER — DEXTROAMPHETAMINE SACCHARATE, AMPHETAMINE ASPARTATE, DEXTROAMPHETAMINE SULFATE AND AMPHETAMINE SULFATE 5; 5; 5; 5 MG/1; MG/1; MG/1; MG/1
20 TABLET ORAL DAILY
Qty: 30 TABLET | Refills: 0 | Status: SHIPPED | OUTPATIENT
Start: 2021-02-24 | End: 2021-03-25 | Stop reason: SDUPTHER

## 2021-02-24 NOTE — TELEPHONE ENCOUNTER
Medicine Refill Request    Last Office Visit: 7/13/2020  Last Telemedicine Visit: Visit date not found    Next Office Visit: Visit date not found  Next Telemedicine Visit: Visit date not found         Current Outpatient Medications:   •  amphetamine-dextroamphetamine XR (ADDERALL XR) 20 mg 24 hr capsule, Take one capsule by mouth every morning upon awakening, Disp: 30 capsule, Rfl: 0  •  cephalexin (KEFLEX) 500 mg capsule, , Disp: , Rfl:   •  dextroamphetamine-amphetamine (ADDERALL) 20 mg tablet, Take 1 tablet (20 mg total) by mouth daily., Disp: 30 tablet, Rfl: 0  •  gabapentin enacarbil 300 mg tablet extended release, Take 300 mg by mouth nightly., Disp: 30 tablet, Rfl: 3  •  halobetasol (ULTRAVATE) 0.05 % cream, Apply topically 2 (two) times a day., Disp: 50 g, Rfl: 0  •  mupirocin (BACTROBAN) 2 % ointment, , Disp: , Rfl:   •  selenium sulfide 2.5 % lotion, Apply every 3 days as directed, Disp: 1 Bottle, Rfl: 1      BP Readings from Last 3 Encounters:   07/13/20 140/78   01/09/20 120/80   07/30/19 120/68       Recent Lab results:  Lab Results   Component Value Date    CHOL 224 (H) 05/20/2019   ,   Lab Results   Component Value Date    HDL 77 05/20/2019   ,   Lab Results   Component Value Date    LDLCALC 126 (H) 05/20/2019   ,   Lab Results   Component Value Date    TRIG 105 05/20/2019        Lab Results   Component Value Date    GLUCOSE 102 (H) 05/20/2019   , No results found for: HGBA1C      Lab Results   Component Value Date    CREATININE 1.03 05/20/2019       No results found for: TSH

## 2021-02-24 NOTE — TELEPHONE ENCOUNTER
.Medicine Refill Request    PDMP Adderall 20mg  Last Refill date 07/13/20  # 30     / 30    Days    PDMP Adderall 20mg ER  Last Refill date 07/07/203  # 30     / 30    Days    Last Office Visit: 7/13/2020  Last Telemedicine Visit: Visit date not found

## 2021-03-25 DIAGNOSIS — F90.0 ATTENTION DEFICIT HYPERACTIVITY DISORDER (ADHD), PREDOMINANTLY INATTENTIVE TYPE: ICD-10-CM

## 2021-03-25 RX ORDER — DEXTROAMPHETAMINE SACCHARATE, AMPHETAMINE ASPARTATE MONOHYDRATE, DEXTROAMPHETAMINE SULFATE AND AMPHETAMINE SULFATE 5; 5; 5; 5 MG/1; MG/1; MG/1; MG/1
CAPSULE, EXTENDED RELEASE ORAL
Qty: 30 CAPSULE | Refills: 0 | Status: SHIPPED | OUTPATIENT
Start: 2021-03-25 | End: 2021-04-27 | Stop reason: SDUPTHER

## 2021-03-25 RX ORDER — DEXTROAMPHETAMINE SACCHARATE, AMPHETAMINE ASPARTATE, DEXTROAMPHETAMINE SULFATE AND AMPHETAMINE SULFATE 5; 5; 5; 5 MG/1; MG/1; MG/1; MG/1
20 TABLET ORAL DAILY
Qty: 30 TABLET | Refills: 0 | Status: SHIPPED | OUTPATIENT
Start: 2021-03-25 | End: 2021-04-27 | Stop reason: SDUPTHER

## 2021-03-25 NOTE — TELEPHONE ENCOUNTER
Medicine Refill Request    PDMP Adderall 20mg ER  Last Refill date 02/24/21  # 30     PDMP Adderall 20 generic  Last Refill date 02/24/21  # 30         Last Office Visit: 7/13/2020  Last Telemedicine Visit: Visit date not found    Next Office Visit: 6/7/2021  Next Telemedicine Visit: Visit date not found

## 2021-03-26 ENCOUNTER — TELEPHONE (OUTPATIENT)
Dept: PRIMARY CARE | Facility: CLINIC | Age: 21
End: 2021-03-26

## 2021-03-26 NOTE — TELEPHONE ENCOUNTER
Called the pharmacy after being on hold for a long time I finally just left a message. Waiting for pharmacy to call back with the member id number at what phone number to call to complete the prior auth

## 2021-03-26 NOTE — TELEPHONE ENCOUNTER
Pt called stating he was speaking to pharmacy & they told him he needed a prior auth for Adderal, there are two rx's . [phone]

## 2021-03-29 NOTE — TELEPHONE ENCOUNTER
Message left informing pt and mom his PA was approved, he will just need to present to the pharmacy for pickup.

## 2021-03-29 NOTE — TELEPHONE ENCOUNTER
Mother calling again looking for prior auth on amphetamine-dextroamphetamine XR (ADDERALL XR) 20 mg 24 hr capsule   [employer] phone # [phone]   Please send it to [facility] in [place], [place]

## 2021-03-29 NOTE — TELEPHONE ENCOUNTER
Mom calling regarding p/a. I have fax that came in w/ info to submit. Putting it on your desk. Either mom carrillo or patient would like c/b once complete.

## 2021-04-27 DIAGNOSIS — F90.0 ATTENTION DEFICIT HYPERACTIVITY DISORDER (ADHD), PREDOMINANTLY INATTENTIVE TYPE: ICD-10-CM

## 2021-04-27 RX ORDER — DEXTROAMPHETAMINE SACCHARATE, AMPHETAMINE ASPARTATE, DEXTROAMPHETAMINE SULFATE AND AMPHETAMINE SULFATE 5; 5; 5; 5 MG/1; MG/1; MG/1; MG/1
20 TABLET ORAL DAILY
Qty: 30 TABLET | Refills: 0 | Status: SHIPPED | OUTPATIENT
Start: 2021-04-27 | End: 2021-05-20 | Stop reason: SDUPTHER

## 2021-04-27 RX ORDER — DEXTROAMPHETAMINE SACCHARATE, AMPHETAMINE ASPARTATE MONOHYDRATE, DEXTROAMPHETAMINE SULFATE AND AMPHETAMINE SULFATE 5; 5; 5; 5 MG/1; MG/1; MG/1; MG/1
CAPSULE, EXTENDED RELEASE ORAL
Qty: 30 CAPSULE | Refills: 0 | Status: SHIPPED | OUTPATIENT
Start: 2021-04-27 | End: 2021-05-20 | Stop reason: SDUPTHER

## 2021-04-27 NOTE — TELEPHONE ENCOUNTER
amphetamine-dextroamphetamine XR (ADDERALL XR) 20 mg 24 hr capsule  dextroamphetamine-amphetamine (ADDERALL) 20 mg tablet (  Pt requesting, pt is out of instant release.   pts # [phone]

## 2021-04-27 NOTE — TELEPHONE ENCOUNTER
Medicine Refill Request  [place] PDMP   Adderall 20mg  Last fill date 07/13/20  Last fill amount #30    [place] PDMP    Adderall XR 20mg  3/29/21 #30  Adderall 20mg 3/25/21 #30      Last Office Visit: 7/13/2020  Last Telemedicine Visit: Visit date not found    Next Office Visit: 6/7/2021  Next Telemedicine Visit: Visit date not found

## 2021-05-19 DIAGNOSIS — F90.0 ATTENTION DEFICIT HYPERACTIVITY DISORDER (ADHD), PREDOMINANTLY INATTENTIVE TYPE: ICD-10-CM

## 2021-05-19 NOTE — TELEPHONE ENCOUNTER
amphetamine-dextroamphetamine XR (ADDERALL XR) 20 mg 24 hr capsule     This med went to pharm in [place], he is now home from school, will need switched to [facility] in [place]. pts # [phone]

## 2021-05-20 RX ORDER — DEXTROAMPHETAMINE SACCHARATE, AMPHETAMINE ASPARTATE MONOHYDRATE, DEXTROAMPHETAMINE SULFATE AND AMPHETAMINE SULFATE 5; 5; 5; 5 MG/1; MG/1; MG/1; MG/1
CAPSULE, EXTENDED RELEASE ORAL
Qty: 30 CAPSULE | Refills: 0 | Status: SHIPPED | OUTPATIENT
Start: 2021-05-20 | End: 2021-06-14 | Stop reason: SDUPTHER

## 2021-05-20 RX ORDER — DEXTROAMPHETAMINE SACCHARATE, AMPHETAMINE ASPARTATE, DEXTROAMPHETAMINE SULFATE AND AMPHETAMINE SULFATE 5; 5; 5; 5 MG/1; MG/1; MG/1; MG/1
20 TABLET ORAL DAILY
Qty: 30 TABLET | Refills: 0 | Status: SHIPPED | OUTPATIENT
Start: 2021-05-20 | End: 2021-06-30

## 2021-05-20 NOTE — TELEPHONE ENCOUNTER
Refilled yesterday to Pharm in [place], pt now home from school, re submit to local pharm      Medicine Refill Request  PDMP  Adderall 20mg 04/27/21 #30  Adderall XR 20mg  03/29/21 #30      Last Office Visit: 7/13/2020  Last Telemedicine Visit: Visit date not found    Next Office Visit: 6/7/2021  Next Telemedicine Visit: Visit date not found

## 2021-06-14 DIAGNOSIS — F90.0 ATTENTION DEFICIT HYPERACTIVITY DISORDER (ADHD), PREDOMINANTLY INATTENTIVE TYPE: ICD-10-CM

## 2021-06-14 NOTE — TELEPHONE ENCOUNTER
Patient need refill of Adderall ER     Medicine Refill Request    Last Office Visit: 7/13/2020  Last Telemedicine Visit: Visit date not found    Next Office Visit: Visit date not found  Next Telemedicine Visit: Visit date not found         Current Outpatient Medications:   •  amphetamine-dextroamphetamine XR (ADDERALL XR) 20 mg 24 hr capsule, Take 1 capsule by mouth  upon awakening, Please make med check appoint, Disp: 30 capsule, Rfl: 0  •  cephalexin (KEFLEX) 500 mg capsule, , Disp: , Rfl:   •  dextroamphetamine-amphetamine (ADDERALL) 20 mg tablet, Take 1 tablet (20 mg total) by mouth daily. Please make med check appoint, Disp: 30 tablet, Rfl: 0  •  gabapentin enacarbil 300 mg tablet extended release, Take 300 mg by mouth nightly., Disp: 30 tablet, Rfl: 3  •  halobetasol (ULTRAVATE) 0.05 % cream, Apply topically 2 (two) times a day., Disp: 50 g, Rfl: 0  •  mupirocin (BACTROBAN) 2 % ointment, , Disp: , Rfl:   •  selenium sulfide 2.5 % lotion, Apply every 3 days as directed, Disp: 1 Bottle, Rfl: 1      BP Readings from Last 3 Encounters:   07/13/20 140/78   01/09/20 120/80   07/30/19 120/68       Recent Lab results:  Lab Results   Component Value Date    CHOL 224 (H) 05/20/2019   ,   Lab Results   Component Value Date    HDL 77 05/20/2019   ,   Lab Results   Component Value Date    LDLCALC 126 (H) 05/20/2019   ,   Lab Results   Component Value Date    TRIG 105 05/20/2019        Lab Results   Component Value Date    GLUCOSE 102 (H) 05/20/2019   , No results found for: HGBA1C      Lab Results   Component Value Date    CREATININE 1.03 05/20/2019       No results found for: TSH

## 2021-06-16 ENCOUNTER — TELEPHONE (OUTPATIENT)
Dept: PRIMARY CARE | Facility: CLINIC | Age: 21
End: 2021-06-16

## 2021-06-16 RX ORDER — DEXTROAMPHETAMINE SACCHARATE, AMPHETAMINE ASPARTATE MONOHYDRATE, DEXTROAMPHETAMINE SULFATE AND AMPHETAMINE SULFATE 5; 5; 5; 5 MG/1; MG/1; MG/1; MG/1
CAPSULE, EXTENDED RELEASE ORAL
Qty: 30 CAPSULE | Refills: 0 | Status: SHIPPED | OUTPATIENT
Start: 2021-06-16 | End: 2021-06-17 | Stop reason: SDUPTHER

## 2021-06-16 NOTE — TELEPHONE ENCOUNTER
Pt calling regarding refill for Adderall.   Pt received the refill for the regular Adderall however he also needed the refill of the Adderall XR 20 mg tablet.   Pt is in [place] on vacation and would like refill sent to the [facility] in [place], [place] (phone [phone])  Pt can be reached at [phone]

## 2021-06-17 DIAGNOSIS — F90.0 ATTENTION DEFICIT HYPERACTIVITY DISORDER (ADHD), PREDOMINANTLY INATTENTIVE TYPE: ICD-10-CM

## 2021-06-17 RX ORDER — DEXTROAMPHETAMINE SACCHARATE, AMPHETAMINE ASPARTATE MONOHYDRATE, DEXTROAMPHETAMINE SULFATE AND AMPHETAMINE SULFATE 5; 5; 5; 5 MG/1; MG/1; MG/1; MG/1
CAPSULE, EXTENDED RELEASE ORAL
Qty: 30 CAPSULE | Refills: 0 | Status: SHIPPED | OUTPATIENT
Start: 2021-06-17 | End: 2021-06-30

## 2021-06-17 NOTE — TELEPHONE ENCOUNTER
Could you please let him know it will be filled today, sent to [facility] for approval

## 2021-06-30 ENCOUNTER — TELEPHONE (OUTPATIENT)
Dept: PRIMARY CARE | Facility: CLINIC | Age: 21
End: 2021-06-30

## 2021-06-30 DIAGNOSIS — F90.0 ATTENTION DEFICIT HYPERACTIVITY DISORDER (ADHD), PREDOMINANTLY INATTENTIVE TYPE: ICD-10-CM

## 2021-06-30 RX ORDER — DEXTROAMPHETAMINE SACCHARATE, AMPHETAMINE ASPARTATE, DEXTROAMPHETAMINE SULFATE AND AMPHETAMINE SULFATE 5; 5; 5; 5 MG/1; MG/1; MG/1; MG/1
20 TABLET ORAL DAILY
Qty: 30 TABLET | Refills: 0 | Status: SHIPPED | OUTPATIENT
Start: 2021-06-30 | End: 2021-08-02 | Stop reason: SDUPTHER

## 2021-06-30 NOTE — TELEPHONE ENCOUNTER
Medicine Refill Request      Kevin would like a refill for Adderall XR 20mg sent to [facility] in [facility]. It appears the refill was sent to [facility] in, [place] on 06/17/21  PDMP#           Last Office Visit: 7/13/2020  Last Telemedicine Visit: Visit date not found    Next Office Visit: Visit date not found  Next Telemedicine Visit: Visit date not found         Current Outpatient Medications:   •  amphetamine-dextroamphetamine XR (ADDERALL XR) 20 mg 24 hr capsule, Take 1 capsule by mouth  upon awakening, Please make med check appoint, Disp: 30 capsule, Rfl: 0  •  cephalexin (KEFLEX) 500 mg capsule, , Disp: , Rfl:   •  dextroamphetamine-amphetamine (ADDERALL) 20 mg tablet, Take 1 tablet (20 mg total) by mouth daily. Please make med check appoint, Disp: 30 tablet, Rfl: 0  •  gabapentin enacarbil 300 mg tablet extended release, Take 300 mg by mouth nightly., Disp: 30 tablet, Rfl: 3  •  halobetasol (ULTRAVATE) 0.05 % cream, Apply topically 2 (two) times a day., Disp: 50 g, Rfl: 0  •  mupirocin (BACTROBAN) 2 % ointment, , Disp: , Rfl:   •  selenium sulfide 2.5 % lotion, Apply every 3 days as directed, Disp: 1 Bottle, Rfl: 1      BP Readings from Last 3 Encounters:   07/13/20 140/78   01/09/20 120/80   07/30/19 120/68       Recent Lab results:  Lab Results   Component Value Date    CHOL 224 (H) 05/20/2019   ,   Lab Results   Component Value Date    HDL 77 05/20/2019   ,   Lab Results   Component Value Date    LDLCALC 126 (H) 05/20/2019   ,   Lab Results   Component Value Date    TRIG 105 05/20/2019        Lab Results   Component Value Date    GLUCOSE 102 (H) 05/20/2019   , No results found for: HGBA1C      Lab Results   Component Value Date    CREATININE 1.03 05/20/2019       No results found for: TSH

## 2021-08-02 DIAGNOSIS — F90.0 ATTENTION DEFICIT HYPERACTIVITY DISORDER (ADHD), PREDOMINANTLY INATTENTIVE TYPE: ICD-10-CM

## 2021-08-02 RX ORDER — DEXTROAMPHETAMINE SACCHARATE, AMPHETAMINE ASPARTATE, DEXTROAMPHETAMINE SULFATE AND AMPHETAMINE SULFATE 5; 5; 5; 5 MG/1; MG/1; MG/1; MG/1
20 TABLET ORAL DAILY
Qty: 30 TABLET | Refills: 0 | Status: SHIPPED | OUTPATIENT
Start: 2021-08-02 | End: 2021-09-02 | Stop reason: SDUPTHER

## 2021-08-02 NOTE — TELEPHONE ENCOUNTER
PDMP             Medicine Refill Request    Last Office Visit: 7/13/2020  Last Telemedicine Visit: Visit date not found    Next Office Visit: Visit date not found  Next Telemedicine Visit: Visit date not found         Current Outpatient Medications:   •  cephalexin (KEFLEX) 500 mg capsule, , Disp: , Rfl:   •  dextroamphetamine-amphetamine (ADDERALL) 20 mg tablet, Take 1 tablet (20 mg total) by mouth daily. Please make med check appoint, Disp: 30 tablet, Rfl: 0  •  gabapentin enacarbil 300 mg tablet extended release, Take 300 mg by mouth nightly., Disp: 30 tablet, Rfl: 3  •  halobetasol (ULTRAVATE) 0.05 % cream, Apply topically 2 (two) times a day., Disp: 50 g, Rfl: 0  •  mupirocin (BACTROBAN) 2 % ointment, , Disp: , Rfl:   •  selenium sulfide 2.5 % lotion, Apply every 3 days as directed, Disp: 1 Bottle, Rfl: 1      BP Readings from Last 3 Encounters:   07/13/20 140/78   01/09/20 120/80   07/30/19 120/68       Recent Lab results:  Lab Results   Component Value Date    CHOL 224 (H) 05/20/2019   ,   Lab Results   Component Value Date    HDL 77 05/20/2019   ,   Lab Results   Component Value Date    LDLCALC 126 (H) 05/20/2019   ,   Lab Results   Component Value Date    TRIG 105 05/20/2019        Lab Results   Component Value Date    GLUCOSE 102 (H) 05/20/2019   , No results found for: HGBA1C      Lab Results   Component Value Date    CREATININE 1.03 05/20/2019       No results found for: TSH

## 2021-08-19 RX ORDER — DEXTROAMPHETAMINE SULFATE, DEXTROAMPHETAMINE SACCHARATE, AMPHETAMINE SULFATE AND AMPHETAMINE ASPARTATE 5; 5; 5; 5 MG/1; MG/1; MG/1; MG/1
CAPSULE, EXTENDED RELEASE ORAL
Qty: 30 CAPSULE | Refills: 0 | Status: SHIPPED | OUTPATIENT
Start: 2021-08-19 | End: 2021-09-02 | Stop reason: SDUPTHER

## 2021-08-19 RX ORDER — DEXTROAMPHETAMINE SULFATE, DEXTROAMPHETAMINE SACCHARATE, AMPHETAMINE SULFATE AND AMPHETAMINE ASPARTATE 5; 5; 5; 5 MG/1; MG/1; MG/1; MG/1
CAPSULE, EXTENDED RELEASE ORAL
COMMUNITY
Start: 2021-07-15 | End: 2021-08-19 | Stop reason: SDUPTHER

## 2021-08-19 NOTE — TELEPHONE ENCOUNTER
Advised pt he has not been seen 07/2020 and is due for OV. pt states he just moved to [place] for a job and is in the process of finsing a new doctor. Requesting temporary refill until he finds a new pcp.     Medicine Refill Request    Last Office Visit: 7/13/2020  Last Telemedicine Visit: Visit date not found    Next Office Visit: Visit date not found  Next Telemedicine Visit: Visit date not found         Current Outpatient Medications:   •  cephalexin (KEFLEX) 500 mg capsule, , Disp: , Rfl:   •  dextroamphetamine-amphetamine (ADDERALL) 20 mg tablet, Take 1 tablet (20 mg total) by mouth daily. Please make med check appoint, Disp: 30 tablet, Rfl: 0  •  gabapentin enacarbil 300 mg tablet extended release, Take 300 mg by mouth nightly., Disp: 30 tablet, Rfl: 3  •  halobetasol (ULTRAVATE) 0.05 % cream, Apply topically 2 (two) times a day., Disp: 50 g, Rfl: 0  •  mupirocin (BACTROBAN) 2 % ointment, , Disp: , Rfl:   •  selenium sulfide 2.5 % lotion, Apply every 3 days as directed, Disp: 1 Bottle, Rfl: 1      BP Readings from Last 3 Encounters:   07/13/20 140/78   01/09/20 120/80   07/30/19 120/68       Recent Lab results:  Lab Results   Component Value Date    CHOL 224 (H) 05/20/2019   ,   Lab Results   Component Value Date    HDL 77 05/20/2019   ,   Lab Results   Component Value Date    LDLCALC 126 (H) 05/20/2019   ,   Lab Results   Component Value Date    TRIG 105 05/20/2019        Lab Results   Component Value Date    GLUCOSE 102 (H) 05/20/2019   , No results found for: HGBA1C      Lab Results   Component Value Date    CREATININE 1.03 05/20/2019       No results found for: TSH

## 2021-09-02 ENCOUNTER — TELEPHONE (OUTPATIENT)
Dept: PRIMARY CARE | Facility: CLINIC | Age: 21
End: 2021-09-02

## 2021-09-02 DIAGNOSIS — F90.0 ATTENTION DEFICIT HYPERACTIVITY DISORDER (ADHD), PREDOMINANTLY INATTENTIVE TYPE: ICD-10-CM

## 2021-09-02 RX ORDER — DEXTROAMPHETAMINE SACCHARATE, AMPHETAMINE ASPARTATE, DEXTROAMPHETAMINE SULFATE AND AMPHETAMINE SULFATE 5; 5; 5; 5 MG/1; MG/1; MG/1; MG/1
20 TABLET ORAL DAILY
Qty: 30 TABLET | Refills: 0 | Status: SHIPPED | OUTPATIENT
Start: 2021-09-02 | End: 2021-10-02

## 2021-09-02 RX ORDER — DEXTROAMPHETAMINE SULFATE, DEXTROAMPHETAMINE SACCHARATE, AMPHETAMINE SULFATE AND AMPHETAMINE ASPARTATE 5; 5; 5; 5 MG/1; MG/1; MG/1; MG/1
CAPSULE, EXTENDED RELEASE ORAL
Qty: 30 CAPSULE | Refills: 0 | Status: SHIPPED | OUTPATIENT
Start: 2021-09-02 | End: 2021-09-24 | Stop reason: SDUPTHER

## 2021-09-02 NOTE — TELEPHONE ENCOUNTER
Kevin is currently in living in [place] he is in the process of finding a new PCP, but he would like to know if Dr Dneson can refill adderall XR 20mg. He would like the refill to go to [facility] in [place], [place]. His chart does reflect updated pharmacy

## 2021-09-24 DIAGNOSIS — F90.0 ATTENTION DEFICIT HYPERACTIVITY DISORDER (ADHD), PREDOMINANTLY INATTENTIVE TYPE: ICD-10-CM

## 2021-09-24 RX ORDER — DEXTROAMPHETAMINE SULFATE, DEXTROAMPHETAMINE SACCHARATE, AMPHETAMINE SULFATE AND AMPHETAMINE ASPARTATE 5; 5; 5; 5 MG/1; MG/1; MG/1; MG/1
CAPSULE, EXTENDED RELEASE ORAL
Qty: 30 CAPSULE | Refills: 0 | Status: SHIPPED | OUTPATIENT
Start: 2021-09-24

## 2021-09-24 NOTE — TELEPHONE ENCOUNTER
The script for adderal xr 20 mg  was originally sent to the [facility] in [facility], [place], but the local will not have the medication in [place] until oct. Kevin would like the medication sent to [facility] on [address] in [place]